# Patient Record
Sex: FEMALE | Race: WHITE | Employment: UNEMPLOYED | URBAN - METROPOLITAN AREA
[De-identification: names, ages, dates, MRNs, and addresses within clinical notes are randomized per-mention and may not be internally consistent; named-entity substitution may affect disease eponyms.]

---

## 2018-01-10 ENCOUNTER — OFFICE VISIT (OUTPATIENT)
Dept: OBGYN CLINIC | Age: 18
End: 2018-01-10
Payer: COMMERCIAL

## 2018-01-10 ENCOUNTER — HOSPITAL ENCOUNTER (OUTPATIENT)
Age: 18
Setting detail: SPECIMEN
Discharge: HOME | End: 2018-01-10
Payer: COMMERCIAL

## 2018-01-10 VITALS
SYSTOLIC BLOOD PRESSURE: 132 MMHG | HEART RATE: 84 BPM | WEIGHT: 217 LBS | HEIGHT: 61 IN | BODY MASS INDEX: 40.97 KG/M2 | DIASTOLIC BLOOD PRESSURE: 75 MMHG

## 2018-01-10 DIAGNOSIS — R10.84 GENERALIZED ABDOMINAL PAIN: ICD-10-CM

## 2018-01-10 DIAGNOSIS — G89.18 POSTOPERATIVE PAIN: ICD-10-CM

## 2018-01-10 DIAGNOSIS — R10.2 VAGINAL PAIN: ICD-10-CM

## 2018-01-10 DIAGNOSIS — R11.0 NAUSEA IN ADULT: ICD-10-CM

## 2018-01-10 DIAGNOSIS — R30.0 DYSURIA: ICD-10-CM

## 2018-01-10 DIAGNOSIS — R10.84 GENERALIZED ABDOMINAL PAIN: Primary | ICD-10-CM

## 2018-01-10 LAB
DIRECT EXAM: NORMAL
Lab: NORMAL
SPECIMEN DESCRIPTION: NORMAL
STATUS: NORMAL

## 2018-01-10 PROCEDURE — 99203 OFFICE O/P NEW LOW 30 MIN: CPT | Performed by: OBSTETRICS & GYNECOLOGY

## 2018-01-10 RX ORDER — LORAZEPAM 0.5 MG/1
0.5 TABLET ORAL DAILY
COMMUNITY
End: 2021-03-10 | Stop reason: ALTCHOICE

## 2018-01-10 RX ORDER — HYDROCODONE BITARTRATE AND ACETAMINOPHEN 5; 325 MG/1; MG/1
1 TABLET ORAL EVERY 6 HOURS PRN
Qty: 30 TABLET | Refills: 0 | Status: SHIPPED | OUTPATIENT
Start: 2018-01-10 | End: 2018-01-15

## 2018-01-10 RX ORDER — IBUPROFEN 600 MG/1
600 TABLET ORAL EVERY 6 HOURS PRN
COMMUNITY
End: 2022-09-28

## 2018-01-10 RX ORDER — ZALEPLON 10 MG/1
10 CAPSULE ORAL NIGHTLY
COMMUNITY
End: 2022-06-21

## 2018-01-10 RX ORDER — OXCARBAZEPINE 600 MG/1
600 TABLET, FILM COATED ORAL 2 TIMES DAILY
COMMUNITY

## 2018-01-10 RX ORDER — PROMETHAZINE HYDROCHLORIDE 12.5 MG/1
12.5 TABLET ORAL EVERY 6 HOURS PRN
Qty: 30 TABLET | Refills: 0 | Status: SHIPPED | OUTPATIENT
Start: 2018-01-10 | End: 2022-06-21

## 2018-01-10 RX ORDER — ONDANSETRON HYDROCHLORIDE 8 MG/1
8 TABLET, FILM COATED ORAL EVERY 8 HOURS PRN
Status: ON HOLD | COMMUNITY
End: 2019-01-15

## 2018-01-10 RX ORDER — HYDROMORPHONE HYDROCHLORIDE 2 MG/1
2 TABLET ORAL EVERY 4 HOURS PRN
Status: ON HOLD | COMMUNITY
End: 2019-01-15 | Stop reason: ALTCHOICE

## 2018-01-10 ASSESSMENT — ENCOUNTER SYMPTOMS
EYE DISCHARGE: 0
BACK PAIN: 0
EYE PAIN: 0
ABDOMINAL PAIN: 1
NAUSEA: 1
SHORTNESS OF BREATH: 0

## 2018-01-10 NOTE — PROGRESS NOTES
Normal range of motion. She exhibits no deformity. Neurological: She is alert and oriented to person, place, and time. Skin: Skin is warm and dry. Psychiatric: She has a normal mood and affect. Her behavior is normal. Thought content normal.       Assessment/Plan  1. Generalized abdominal pain  - Secondary to laparoscopic surgery. - US Pelvis Complete; Future  - US Non OB Transvaginal; Future  - Had MRSA infection in umbilical site - will repeat culture    2. Postoperative pain  - 1 month s/p laparoscopic detorsion of the right ovary  - US Pelvis Complete; Future  - US Non OB Transvaginal; Future    3. Vaginal pain  - Very tender with palpation. Per mom they put a stitch in the vagina because there was a tear during surgery. - Vaginal discharge with odor noted  - VAGINITIS DNA PROBE; Future    Will call mom with results of ultrasound    Patient was seen with total face to face time of 30 minutes. More than 50% of this visit was on counseling and education regarding her   1. Generalized abdominal pain  US Pelvis Complete    US Non OB Transvaginal   2. Postoperative pain  US Pelvis Complete    US Non OB Transvaginal   3. Vaginal pain  VAGINITIS DNA PROBE    and her options. She was also counseled on her preventative health maintenance recommendations and follow-up.     Tena Dietz MD  [address]

## 2018-01-11 ENCOUNTER — HOSPITAL ENCOUNTER (OUTPATIENT)
Dept: ULTRASOUND IMAGING | Age: 18
Discharge: HOME | End: 2018-01-11
Payer: COMMERCIAL

## 2018-01-11 DIAGNOSIS — G89.18 POSTOPERATIVE PAIN: ICD-10-CM

## 2018-01-11 DIAGNOSIS — R10.84 GENERALIZED ABDOMINAL PAIN: ICD-10-CM

## 2018-01-11 LAB
CULTURE: NORMAL
CULTURE: NORMAL
Lab: NORMAL
SPECIMEN DESCRIPTION: NORMAL
STATUS: NORMAL

## 2018-01-11 PROCEDURE — 76856 US EXAM PELVIC COMPLETE: CPT

## 2018-01-11 PROCEDURE — 76830 TRANSVAGINAL US NON-OB: CPT

## 2018-01-12 DIAGNOSIS — G89.18 POSTOPERATIVE ABDOMINAL PAIN: Primary | ICD-10-CM

## 2018-01-12 DIAGNOSIS — R10.9 POSTOPERATIVE ABDOMINAL PAIN: Primary | ICD-10-CM

## 2018-01-12 LAB
CULTURE: ABNORMAL
DIRECT EXAM: ABNORMAL
Lab: ABNORMAL
ORGANISM: ABNORMAL
SPECIMEN DESCRIPTION: ABNORMAL
STATUS: ABNORMAL

## 2018-01-22 ENCOUNTER — HOSPITAL ENCOUNTER (OUTPATIENT)
Dept: CT IMAGING | Age: 18
Discharge: HOME | End: 2018-01-22
Payer: COMMERCIAL

## 2018-01-22 DIAGNOSIS — G89.18 POSTOPERATIVE ABDOMINAL PAIN: ICD-10-CM

## 2018-01-22 DIAGNOSIS — R10.9 POSTOPERATIVE ABDOMINAL PAIN: ICD-10-CM

## 2018-01-22 PROCEDURE — 6360000004 HC RX CONTRAST MEDICATION: Performed by: OBSTETRICS & GYNECOLOGY

## 2018-01-22 PROCEDURE — 74177 CT ABD & PELVIS W/CONTRAST: CPT

## 2018-01-22 PROCEDURE — 2580000003 HC RX 258: Performed by: OBSTETRICS & GYNECOLOGY

## 2018-01-22 RX ORDER — 0.9 % SODIUM CHLORIDE 0.9 %
100 INTRAVENOUS SOLUTION INTRAVENOUS ONCE
Status: COMPLETED | OUTPATIENT
Start: 2018-01-22 | End: 2018-01-22

## 2018-01-22 RX ADMIN — IOPAMIDOL 100 ML: 755 INJECTION, SOLUTION INTRAVENOUS at 15:48

## 2018-01-22 RX ADMIN — SODIUM CHLORIDE 100 ML: 9 INJECTION, SOLUTION INTRAVENOUS at 15:48

## 2018-11-21 ENCOUNTER — OFFICE VISIT (OUTPATIENT)
Dept: INTERNAL MEDICINE CLINIC | Age: 18
End: 2018-11-21
Payer: COMMERCIAL

## 2018-11-21 VITALS
BODY MASS INDEX: 45.31 KG/M2 | SYSTOLIC BLOOD PRESSURE: 122 MMHG | WEIGHT: 240 LBS | DIASTOLIC BLOOD PRESSURE: 80 MMHG | HEIGHT: 61 IN

## 2018-11-21 DIAGNOSIS — Z87.898 HX OF SYNCOPE: ICD-10-CM

## 2018-11-21 DIAGNOSIS — L50.1 CHRONIC IDIOPATHIC URTICARIA: ICD-10-CM

## 2018-11-21 DIAGNOSIS — R16.0 LIVER MASS: ICD-10-CM

## 2018-11-21 DIAGNOSIS — E06.3 HASHIMOTO'S DISEASE: Primary | ICD-10-CM

## 2018-11-21 DIAGNOSIS — N83.519 OVARIAN TORSION: ICD-10-CM

## 2018-11-21 PROCEDURE — G8427 DOCREV CUR MEDS BY ELIG CLIN: HCPCS | Performed by: INTERNAL MEDICINE

## 2018-11-21 PROCEDURE — G8417 CALC BMI ABV UP PARAM F/U: HCPCS | Performed by: INTERNAL MEDICINE

## 2018-11-21 PROCEDURE — G8484 FLU IMMUNIZE NO ADMIN: HCPCS | Performed by: INTERNAL MEDICINE

## 2018-11-21 PROCEDURE — 1036F TOBACCO NON-USER: CPT | Performed by: INTERNAL MEDICINE

## 2018-11-21 PROCEDURE — 99204 OFFICE O/P NEW MOD 45 MIN: CPT | Performed by: INTERNAL MEDICINE

## 2018-11-21 RX ORDER — ZALEPLON 10 MG/1
1 CAPSULE ORAL NIGHTLY
Status: ON HOLD | COMMUNITY
Start: 2018-06-14 | End: 2019-01-15 | Stop reason: SDUPTHER

## 2018-11-21 RX ORDER — DOXEPIN HYDROCHLORIDE 10 MG/1
10 CAPSULE ORAL 2 TIMES DAILY
COMMUNITY
Start: 2018-07-03 | End: 2022-06-21

## 2018-11-21 RX ORDER — LORAZEPAM 1 MG/1
1 TABLET ORAL DAILY
COMMUNITY
Start: 2018-06-14 | End: 2021-03-10 | Stop reason: ALTCHOICE

## 2018-11-21 RX ORDER — OXCARBAZEPINE 600 MG/1
1 TABLET, FILM COATED ORAL 2 TIMES DAILY
Status: ON HOLD | COMMUNITY
Start: 2018-06-14 | End: 2019-01-15 | Stop reason: SDUPTHER

## 2018-11-21 RX ORDER — DESOGESTREL AND ETHINYL ESTRADIOL 0.15-0.03
1 KIT ORAL DAILY
COMMUNITY
Start: 2018-06-23 | End: 2021-03-10 | Stop reason: ALTCHOICE

## 2018-11-21 RX ORDER — MONTELUKAST SODIUM 10 MG/1
1 TABLET ORAL EVERY OTHER DAY
Status: ON HOLD | COMMUNITY
Start: 2018-06-26 | End: 2019-01-15

## 2018-11-21 RX ORDER — LEVOCETIRIZINE DIHYDROCHLORIDE 5 MG/1
10 TABLET, FILM COATED ORAL 2 TIMES DAILY
COMMUNITY
End: 2022-06-21

## 2018-11-21 RX ORDER — FAMOTIDINE 20 MG/1
1 TABLET, FILM COATED ORAL 2 TIMES DAILY
COMMUNITY
Start: 2018-06-26 | End: 2021-03-10 | Stop reason: ALTCHOICE

## 2018-11-21 RX ORDER — LEVOTHYROXINE SODIUM 0.12 MG/1
1 TABLET ORAL DAILY
COMMUNITY
Start: 2018-06-26 | End: 2022-06-21

## 2018-11-21 ASSESSMENT — PATIENT HEALTH QUESTIONNAIRE - PHQ9
SUM OF ALL RESPONSES TO PHQ QUESTIONS 1-9: 2
2. FEELING DOWN, DEPRESSED OR HOPELESS: 1
1. LITTLE INTEREST OR PLEASURE IN DOING THINGS: 1
SUM OF ALL RESPONSES TO PHQ9 QUESTIONS 1 & 2: 2
SUM OF ALL RESPONSES TO PHQ QUESTIONS 1-9: 2

## 2018-11-21 NOTE — PROGRESS NOTES
Visit Information    Have you changed or started any medications since your last visit including any over-the-counter medicines, vitamins, or herbal medicines? no   Are you having any side effects from any of your medications? -  no  Have you stopped taking any of your medications? Is so, why? -  no    Have you seen any other physician or provider since your last visit? Yes - Records Obtained  Have you had any other diagnostic tests since your last visit? Yes - Records Obtained  Have you been seen in the emergency room and/or had an admission to a hospital since we last saw you? Yes - Records Obtained  Have you had your routine dental cleaning in the past 6 months? no    Have you activated your [facility] account? If not, what are your barriers?  No:      Patient Care Team:  Lionel oMss MD as PCP - General (Internal Medicine)    Medical History Review  Past Medical, Family, and Social History reviewed and does contribute to the patient presenting condition  Chief Complaint   Patient presents with   Evern Dural Establish Care    Urticaria     She has hives with angioedema, acute anaphylaxis     Mass     liver mass found at the ED      Health Maintenance   Topic Date Due    HIV screen  12/14/2013    Meningococcal (MCV) Vaccine Age 0-22 Years (1 of 1 - 2-dose series) 12/14/2014    Chlamydia screen  12/14/2014    DTaP/Tdap/Td vaccine (1 - Tdap) 12/14/2017    Flu vaccine (1) 09/01/2018                                                                                                                                                                                                          OFFICE VISIT  PROGRESS NOTE         Date of patient's visit: 11/23/2018  Patient's Name:  Pilar Kan  YOB: 2000       Patient Care Team:  Lionel Moss MD as PCP - General (Internal Medicine)        SUBJECTIVE     HISTORY           History of present illness     Pertinent details  added to ,       Chief Complaint   Patient

## 2018-11-27 ENCOUNTER — HOSPITAL ENCOUNTER (OUTPATIENT)
Age: 18
Setting detail: SPECIMEN
Discharge: HOME | End: 2018-11-27
Payer: COMMERCIAL

## 2018-11-27 ENCOUNTER — HOSPITAL ENCOUNTER (OUTPATIENT)
Dept: ULTRASOUND IMAGING | Age: 18
Discharge: HOME | End: 2018-11-29
Payer: COMMERCIAL

## 2018-11-27 DIAGNOSIS — R16.0 LIVER MASS: ICD-10-CM

## 2018-11-27 DIAGNOSIS — L50.1 CHRONIC IDIOPATHIC URTICARIA: ICD-10-CM

## 2018-11-27 LAB
ALBUMIN SERPL-MCNC: 3.8 G/DL (ref 3.5–5.2)
ALBUMIN/GLOBULIN RATIO: 1.2 (ref 1–2.5)
ALP BLD-CCNC: 80 U/L (ref 35–104)
ALT SERPL-CCNC: 6 U/L (ref 5–33)
AST SERPL-CCNC: 8 U/L
BILIRUB SERPL-MCNC: 0.17 MG/DL (ref 0.3–1.2)
BILIRUBIN DIRECT: <0.08 MG/DL
BILIRUBIN, INDIRECT: ABNORMAL MG/DL (ref 0–1)
GLOBULIN: ABNORMAL G/DL (ref 1.5–3.8)
HEPATITIS C ANTIBODY: NONREACTIVE
SEDIMENTATION RATE, ERYTHROCYTE: 36 MM (ref 0–20)
TOTAL PROTEIN: 7 G/DL (ref 6.4–8.3)
VITAMIN D 25-HYDROXY: 41.2 NG/ML (ref 30–100)

## 2018-11-27 PROCEDURE — 76705 ECHO EXAM OF ABDOMEN: CPT

## 2018-11-28 LAB — ANTI-NUCLEAR ANTIBODY (ANA): NEGATIVE

## 2018-11-30 LAB — C1 ESTERASE INHIBITOR: 27 MG/DL (ref 21–39)

## 2018-12-03 ENCOUNTER — TELEPHONE (OUTPATIENT)
Dept: INTERNAL MEDICINE CLINIC | Age: 18
End: 2018-12-03

## 2018-12-04 ENCOUNTER — OFFICE VISIT (OUTPATIENT)
Dept: GASTROENTEROLOGY | Age: 18
End: 2018-12-04
Payer: COMMERCIAL

## 2018-12-04 VITALS
WEIGHT: 236.6 LBS | BODY MASS INDEX: 44.34 KG/M2 | SYSTOLIC BLOOD PRESSURE: 129 MMHG | HEART RATE: 91 BPM | DIASTOLIC BLOOD PRESSURE: 82 MMHG

## 2018-12-04 DIAGNOSIS — R16.0 LIVER MASS: Primary | ICD-10-CM

## 2018-12-04 PROBLEM — F41.9 ANXIETY: Status: ACTIVE | Noted: 2018-04-27

## 2018-12-04 PROBLEM — R93.89 ABNORMAL FINDING ON RADIOLOGY EXAM: Status: ACTIVE | Noted: 2017-08-01

## 2018-12-04 PROBLEM — R55 NEUROCARDIOGENIC SYNCOPE: Status: ACTIVE | Noted: 2018-04-27

## 2018-12-04 PROBLEM — E61.1 IRON DEFICIENCY: Status: ACTIVE | Noted: 2017-08-01

## 2018-12-04 PROBLEM — L50.8 CHRONIC URTICARIA: Status: ACTIVE | Noted: 2018-04-26

## 2018-12-04 PROBLEM — R19.7 BLOODY DIARRHEA: Status: ACTIVE | Noted: 2017-06-22

## 2018-12-04 PROCEDURE — G8484 FLU IMMUNIZE NO ADMIN: HCPCS | Performed by: INTERNAL MEDICINE

## 2018-12-04 PROCEDURE — G8417 CALC BMI ABV UP PARAM F/U: HCPCS | Performed by: INTERNAL MEDICINE

## 2018-12-04 PROCEDURE — G8427 DOCREV CUR MEDS BY ELIG CLIN: HCPCS | Performed by: INTERNAL MEDICINE

## 2018-12-04 PROCEDURE — 99244 OFF/OP CNSLTJ NEW/EST MOD 40: CPT | Performed by: INTERNAL MEDICINE

## 2018-12-04 ASSESSMENT — ENCOUNTER SYMPTOMS
DIARRHEA: 0
ABDOMINAL PAIN: 0
TROUBLE SWALLOWING: 0
RECTAL PAIN: 0
ABDOMINAL DISTENTION: 0
SINUS PRESSURE: 0
CONSTIPATION: 0
SINUS PAIN: 0
CHOKING: 0
EYES NEGATIVE: 1
NAUSEA: 0
BLOOD IN STOOL: 0
VOMITING: 0
ANAL BLEEDING: 0
COUGH: 0

## 2018-12-04 NOTE — PROGRESS NOTES
Subjective:      Patient ID: Erica Small is a 21 y.o. female. HPI . Dr. Pasha Bernal MD has requested that I see Erica Small for a consult for   1. Liver mass     . Rhina Rodriguez, 49-year-old the patient seen in the office along with her mother. Patient had generalized hives. To further evaluate the etiology for hives, patient had a CT scan done which revealed a lesion in the right lobe of the liver. Subsequently patient had ultrasound of the liver done which revealed possible hemangioma. This lesion is about 3 cm. On further discussion patient denies abdominal pain, bloating, nausea or vomiting. No fever or chills. Has a good appetite and there is no weight loss. Not known to have liver disease. She is not an alcoholic. No history of hepatitis, jaundice etc.  No family history of liver lesions. No family history of liver cancer. There is no family history of hepatitis. Patient known to have ovarian cysts for which she had surgery done in the past.  Patient has been on birth control pills regarding this. Patient has been on birth control pills in the last to 3 years. Patient has history of hives. Also states that she may have intermittent anaphylactic reactions. The full details regarding this is not clear. She is under the care of the allergist.  After discussion, mother indicates that this patient may have idiopathic urticaria. Past Medical, Family, and Social History reviewed and does contribute to the patient presenting condition. patient\"s PMH/PSH,SH,PSYCH hx, MEDs, ALLERGIES, and ROS was all reviewed and updated ion the appropriate sections    Review of Systems   Constitutional: Negative. HENT: Positive for nosebleeds. Negative for sinus pain, sinus pressure and trouble swallowing. Eyes: Negative. Respiratory: Negative for cough and choking. Cardiovascular: Negative.     Gastrointestinal: Negative for abdominal distention, abdominal pain, anal bleeding, blood in with an ultrasound of the liver in the next 6 months. Also we'll obtain viral hepatitis screen. This was recommended to patient and patient's mother, they understood and agreed for the management plan. Advised to see in the next 6 months.

## 2018-12-05 ENCOUNTER — TELEPHONE (OUTPATIENT)
Dept: GASTROENTEROLOGY | Age: 18
End: 2018-12-05

## 2018-12-05 NOTE — TELEPHONE ENCOUNTER
Writer spoke to patients mother regarding labs that [facility] would like the patient to get done, mother Micheal Flores understood and agreed and said that the patient would be in the office tomorrow to grab the lab slips.

## 2018-12-20 ENCOUNTER — HOSPITAL ENCOUNTER (OUTPATIENT)
Age: 18
Setting detail: SPECIMEN
Discharge: HOME | End: 2018-12-20
Payer: COMMERCIAL

## 2018-12-20 DIAGNOSIS — R16.0 LIVER MASS: ICD-10-CM

## 2018-12-21 LAB
AFP: 2.6 UG/L
HAV IGM SER IA-ACNC: NONREACTIVE
HEPATITIS B CORE IGM ANTIBODY: NONREACTIVE
HEPATITIS B SURFACE ANTIGEN: NONREACTIVE
HEPATITIS C ANTIBODY: NONREACTIVE

## 2018-12-27 LAB
EBV EARLY ANTIGEN IGG: 61 U/ML
EPSTEIN-BARR VCA IGG: 1089 U/ML
EPSTEIN-BARR VCA IGM: 95 U/ML

## 2019-01-09 ENCOUNTER — OFFICE VISIT (OUTPATIENT)
Dept: GASTROENTEROLOGY | Age: 19
End: 2019-01-09
Payer: COMMERCIAL

## 2019-01-09 VITALS
HEART RATE: 95 BPM | DIASTOLIC BLOOD PRESSURE: 80 MMHG | SYSTOLIC BLOOD PRESSURE: 128 MMHG | HEIGHT: 61 IN | WEIGHT: 235.7 LBS | BODY MASS INDEX: 44.5 KG/M2

## 2019-01-09 DIAGNOSIS — R93.89 ABNORMAL FINDING ON RADIOLOGY EXAM: ICD-10-CM

## 2019-01-09 DIAGNOSIS — Z87.898 HX OF SYNCOPE: Primary | ICD-10-CM

## 2019-01-09 DIAGNOSIS — R10.13 ABDOMINAL PAIN, EPIGASTRIC: ICD-10-CM

## 2019-01-09 DIAGNOSIS — K76.9 LIVER LESION: ICD-10-CM

## 2019-01-09 PROCEDURE — 1036F TOBACCO NON-USER: CPT | Performed by: INTERNAL MEDICINE

## 2019-01-09 PROCEDURE — G8417 CALC BMI ABV UP PARAM F/U: HCPCS | Performed by: INTERNAL MEDICINE

## 2019-01-09 PROCEDURE — G8484 FLU IMMUNIZE NO ADMIN: HCPCS | Performed by: INTERNAL MEDICINE

## 2019-01-09 PROCEDURE — G8427 DOCREV CUR MEDS BY ELIG CLIN: HCPCS | Performed by: INTERNAL MEDICINE

## 2019-01-09 PROCEDURE — 99214 OFFICE O/P EST MOD 30 MIN: CPT | Performed by: INTERNAL MEDICINE

## 2019-01-09 ASSESSMENT — ENCOUNTER SYMPTOMS
VOMITING: 1
DIARRHEA: 1
WHEEZING: 0
SHORTNESS OF BREATH: 1
EYE REDNESS: 0
CONSTIPATION: 0
RECTAL PAIN: 0
NAUSEA: 1
ABDOMINAL DISTENTION: 1
BLOOD IN STOOL: 0
ABDOMINAL PAIN: 1
SINUS PAIN: 0
SINUS PRESSURE: 0
ANAL BLEEDING: 0
CHEST TIGHTNESS: 0
EYE PAIN: 0
TROUBLE SWALLOWING: 0

## 2019-01-15 ENCOUNTER — HOSPITAL ENCOUNTER (OUTPATIENT)
Age: 19
Setting detail: OUTPATIENT SURGERY
Discharge: HOME | End: 2019-01-15
Attending: INTERNAL MEDICINE | Admitting: INTERNAL MEDICINE
Payer: COMMERCIAL

## 2019-01-15 ENCOUNTER — HOSPITAL ENCOUNTER (OUTPATIENT)
Dept: MRI IMAGING | Age: 19
Discharge: HOME | End: 2019-01-17
Payer: COMMERCIAL

## 2019-01-15 VITALS
SYSTOLIC BLOOD PRESSURE: 129 MMHG | TEMPERATURE: 97.3 F | OXYGEN SATURATION: 96 % | BODY MASS INDEX: 43.43 KG/M2 | WEIGHT: 230 LBS | DIASTOLIC BLOOD PRESSURE: 83 MMHG | RESPIRATION RATE: 20 BRPM | HEIGHT: 61 IN | HEART RATE: 100 BPM

## 2019-01-15 VITALS — HEIGHT: 61 IN | WEIGHT: 230 LBS | BODY MASS INDEX: 43.43 KG/M2

## 2019-01-15 DIAGNOSIS — K76.9 LIVER LESION: ICD-10-CM

## 2019-01-15 PROCEDURE — 88305 TISSUE EXAM BY PATHOLOGIST: CPT

## 2019-01-15 PROCEDURE — 99152 MOD SED SAME PHYS/QHP 5/>YRS: CPT | Performed by: INTERNAL MEDICINE

## 2019-01-15 PROCEDURE — A9581 GADOXETATE DISODIUM INJ: HCPCS | Performed by: INTERNAL MEDICINE

## 2019-01-15 PROCEDURE — 6360000004 HC RX CONTRAST MEDICATION: Performed by: INTERNAL MEDICINE

## 2019-01-15 PROCEDURE — 74183 MRI ABD W/O CNTR FLWD CNTR: CPT

## 2019-01-15 PROCEDURE — 6360000002 HC RX W HCPCS: Performed by: INTERNAL MEDICINE

## 2019-01-15 PROCEDURE — 3609012400 HC EGD TRANSORAL BIOPSY SINGLE/MULTIPLE: Performed by: INTERNAL MEDICINE

## 2019-01-15 PROCEDURE — 88342 IMHCHEM/IMCYTCHM 1ST ANTB: CPT

## 2019-01-15 PROCEDURE — 2580000003 HC RX 258: Performed by: INTERNAL MEDICINE

## 2019-01-15 PROCEDURE — 2709999900 HC NON-CHARGEABLE SUPPLY: Performed by: INTERNAL MEDICINE

## 2019-01-15 PROCEDURE — 6370000000 HC RX 637 (ALT 250 FOR IP): Performed by: INTERNAL MEDICINE

## 2019-01-15 PROCEDURE — 7100000011 HC PHASE II RECOVERY - ADDTL 15 MIN: Performed by: INTERNAL MEDICINE

## 2019-01-15 PROCEDURE — 7100000010 HC PHASE II RECOVERY - FIRST 15 MIN: Performed by: INTERNAL MEDICINE

## 2019-01-15 RX ORDER — MIDAZOLAM HYDROCHLORIDE 1 MG/ML
INJECTION INTRAMUSCULAR; INTRAVENOUS PRN
Status: DISCONTINUED | OUTPATIENT
Start: 2019-01-15 | End: 2019-01-15 | Stop reason: HOSPADM

## 2019-01-15 RX ORDER — FENTANYL CITRATE 50 UG/ML
INJECTION, SOLUTION INTRAMUSCULAR; INTRAVENOUS PRN
Status: DISCONTINUED | OUTPATIENT
Start: 2019-01-15 | End: 2019-01-15 | Stop reason: HOSPADM

## 2019-01-15 RX ORDER — 0.9 % SODIUM CHLORIDE 0.9 %
50 INTRAVENOUS SOLUTION INTRAVENOUS ONCE
Status: COMPLETED | OUTPATIENT
Start: 2019-01-15 | End: 2019-01-15

## 2019-01-15 RX ORDER — SODIUM CHLORIDE 0.9 % (FLUSH) 0.9 %
10 SYRINGE (ML) INJECTION PRN
Status: DISCONTINUED | OUTPATIENT
Start: 2019-01-15 | End: 2019-01-18 | Stop reason: HOSPADM

## 2019-01-15 RX ORDER — SODIUM CHLORIDE 9 MG/ML
INJECTION, SOLUTION INTRAVENOUS CONTINUOUS
Status: DISCONTINUED | OUTPATIENT
Start: 2019-01-15 | End: 2019-01-15 | Stop reason: HOSPADM

## 2019-01-15 RX ADMIN — SODIUM CHLORIDE: 9 INJECTION, SOLUTION INTRAVENOUS at 08:50

## 2019-01-15 RX ADMIN — GADOXETATE DISODIUM 10 ML: 181.43 INJECTION, SOLUTION INTRAVENOUS at 08:15

## 2019-01-15 RX ADMIN — SODIUM CHLORIDE 50 ML: 9 INJECTION, SOLUTION INTRAVENOUS at 08:14

## 2019-01-15 RX ADMIN — Medication 10 ML: at 08:15

## 2019-01-15 ASSESSMENT — PAIN - FUNCTIONAL ASSESSMENT: PAIN_FUNCTIONAL_ASSESSMENT: 0-10

## 2019-01-15 ASSESSMENT — PAIN SCALES - GENERAL: PAINLEVEL_OUTOF10: 0

## 2019-01-15 ASSESSMENT — PAIN DESCRIPTION - DESCRIPTORS: DESCRIPTORS: CRAMPING

## 2019-01-17 ENCOUNTER — TELEPHONE (OUTPATIENT)
Dept: FAMILY MEDICINE CLINIC | Age: 19
End: 2019-01-17

## 2019-01-17 LAB — SURGICAL PATHOLOGY REPORT: NORMAL

## 2019-03-26 ENCOUNTER — OFFICE VISIT (OUTPATIENT)
Dept: OBGYN CLINIC | Age: 19
End: 2019-03-26
Payer: COMMERCIAL

## 2019-03-26 VITALS
SYSTOLIC BLOOD PRESSURE: 136 MMHG | HEART RATE: 123 BPM | DIASTOLIC BLOOD PRESSURE: 84 MMHG | HEIGHT: 61 IN | WEIGHT: 232 LBS | BODY MASS INDEX: 43.8 KG/M2

## 2019-03-26 DIAGNOSIS — E66.01 CLASS 3 SEVERE OBESITY WITHOUT SERIOUS COMORBIDITY WITH BODY MASS INDEX (BMI) OF 40.0 TO 44.9 IN ADULT, UNSPECIFIED OBESITY TYPE (HCC): ICD-10-CM

## 2019-03-26 DIAGNOSIS — D13.4 HEPATIC ADENOMA: Primary | ICD-10-CM

## 2019-03-26 DIAGNOSIS — Z87.42 HISTORY OF OVARIAN CYST: ICD-10-CM

## 2019-03-26 PROCEDURE — 1036F TOBACCO NON-USER: CPT | Performed by: OBSTETRICS & GYNECOLOGY

## 2019-03-26 PROCEDURE — G8427 DOCREV CUR MEDS BY ELIG CLIN: HCPCS | Performed by: OBSTETRICS & GYNECOLOGY

## 2019-03-26 PROCEDURE — 99213 OFFICE O/P EST LOW 20 MIN: CPT | Performed by: OBSTETRICS & GYNECOLOGY

## 2019-03-26 PROCEDURE — G8417 CALC BMI ABV UP PARAM F/U: HCPCS | Performed by: OBSTETRICS & GYNECOLOGY

## 2019-03-26 PROCEDURE — G8484 FLU IMMUNIZE NO ADMIN: HCPCS | Performed by: OBSTETRICS & GYNECOLOGY

## 2019-03-26 ASSESSMENT — ENCOUNTER SYMPTOMS
SHORTNESS OF BREATH: 0
COUGH: 0
BACK PAIN: 0

## 2021-03-10 ENCOUNTER — OFFICE VISIT (OUTPATIENT)
Dept: OBGYN CLINIC | Age: 21
End: 2021-03-10
Payer: MEDICAID

## 2021-03-10 VITALS
DIASTOLIC BLOOD PRESSURE: 85 MMHG | SYSTOLIC BLOOD PRESSURE: 125 MMHG | HEART RATE: 95 BPM | BODY MASS INDEX: 44.75 KG/M2 | WEIGHT: 237 LBS | HEIGHT: 61 IN

## 2021-03-10 DIAGNOSIS — N63.31 LUMP OF AXILLARY TAIL OF RIGHT BREAST: Primary | ICD-10-CM

## 2021-03-10 PROCEDURE — 1036F TOBACCO NON-USER: CPT | Performed by: OBSTETRICS & GYNECOLOGY

## 2021-03-10 PROCEDURE — G8417 CALC BMI ABV UP PARAM F/U: HCPCS | Performed by: OBSTETRICS & GYNECOLOGY

## 2021-03-10 PROCEDURE — G8428 CUR MEDS NOT DOCUMENT: HCPCS | Performed by: OBSTETRICS & GYNECOLOGY

## 2021-03-10 PROCEDURE — 99212 OFFICE O/P EST SF 10 MIN: CPT | Performed by: OBSTETRICS & GYNECOLOGY

## 2021-03-10 PROCEDURE — G8484 FLU IMMUNIZE NO ADMIN: HCPCS | Performed by: OBSTETRICS & GYNECOLOGY

## 2021-03-10 RX ORDER — PHENOL 1.4 %
10 AEROSOL, SPRAY (ML) MUCOUS MEMBRANE NIGHTLY PRN
COMMUNITY
Start: 2021-02-26 | End: 2021-03-28

## 2021-03-10 RX ORDER — HYDROXYZINE HYDROCHLORIDE 25 MG/1
25 TABLET, FILM COATED ORAL 2 TIMES DAILY PRN
COMMUNITY
Start: 2021-02-26 | End: 2021-03-28

## 2021-03-10 RX ORDER — MIRTAZAPINE 30 MG/1
30 TABLET, FILM COATED ORAL NIGHTLY
COMMUNITY
Start: 2021-02-26 | End: 2022-06-21

## 2021-03-10 RX ORDER — LEVOTHYROXINE SODIUM 0.1 MG/1
150 TABLET ORAL DAILY
COMMUNITY
End: 2021-03-10 | Stop reason: SDUPTHER

## 2021-03-10 ASSESSMENT — ENCOUNTER SYMPTOMS
SHORTNESS OF BREATH: 0
ABDOMINAL PAIN: 0
BACK PAIN: 0
COUGH: 0

## 2021-03-10 NOTE — PROGRESS NOTES
[facility]  [facility] - [address] [facility] [address] [facility]  Dept: [phone]  Dept Fax: [phone]    03/10/21    Chief Complaint   Patient presents with    Breast Mass     Pt states she has a breast lump that comes and goes with ehr period that she would like to discuss        Anai Sutherland 23 y.o. has a complaint of a breast lump. She has a lump in her right armpit. She says that she get them with her periods. She was told previously that they were lymph nodes that swell and then go back to normal.  She was at the hospital last month for a suicide attempt. She says that she was at [facility] for a bit and they changed her medications and that has helped. She is on her period today and can't do her Pap smear. She has never been sexually active. She says her periods have been irregular every 3-5 weeks, but she cannot be on combination birth control due to her hepatic adenoma. She says they last for over a week. Review of Systems   Constitutional: Negative for chills and fever. HENT: Negative for congestion. Respiratory: Negative for cough and shortness of breath. Cardiovascular: Negative for chest pain and palpitations. Gastrointestinal: Negative for abdominal pain. Genitourinary: Negative for dyspareunia and vaginal discharge. Musculoskeletal: Negative for back pain. Neurological: Negative for dizziness and light-headedness. Psychiatric/Behavioral: The patient is not nervous/anxious. Gynecologic History  Patient's last menstrual period was 2021.   Contraception: abstinence  Last Pap: n/a    Obstetric History  : 0  Para: 0  AB: 0    Past Medical History:   Diagnosis Date    Acid reflux     Allergy, urticaria     Anxiety     Depression     Hashimoto's thyroiditis     History of seizures     last 2019    Ischemic colon ([address]     MRSA infection     Obesity     Syncope     cardioneurogenic     Past Surgical History: Procedure Laterality Date    COLONOSCOPY  2017    OVARY SURGERY Right     for ovarian torsion    TYMPANOSTOMY TUBE PLACEMENT Bilateral     at age 1   Polly Cushing UPPER GASTROINTESTINAL ENDOSCOPY  2017    UPPER GASTROINTESTINAL ENDOSCOPY N/A 1/15/2019    EGD BIOPSY performed by Mike Javed MD at [address]   Allergen Reactions    Prunus Persica Nausea And Vomiting    Soap Rash     Tide Detergent    Zofran [Ondansetron Hcl] Hives     Current Outpatient Medications   Medication Sig Dispense Refill    mirtazapine (REMERON) 30 MG tablet Take 30 mg by mouth nightly      Melatonin 10 MG TABS Take 10 mg by mouth nightly as needed      hydrOXYzine (ATARAX) 25 MG tablet Take 25 mg by mouth 2 times daily as needed      levothyroxine (SYNTHROID) 125 MCG tablet Take 1 tablet by mouth daily Indications: pt taking 150mcg       doxepin (SINEQUAN) 10 MG capsule Take 10 mg by mouth 2 times daily      levocetirizine (XYZAL) 5 MG tablet Take 10 mg by mouth 2 times daily      diphenhydrAMINE (BENADRYL) 50 MG tablet Take 75 mg by mouth      OXcarbazepine (TRILEPTAL) 600 MG tablet Take 600 mg by mouth 2 times daily      ibuprofen (ADVIL;MOTRIN) 600 MG tablet Take 600 mg by mouth every 6 hours as needed for Pain      promethazine (PHENERGAN) 12.5 MG tablet Take 1 tablet by mouth every 6 hours as needed for Nausea 30 tablet 0    Omalizumab (XOLAIR SC) Inject into the skin every 30 days      zaleplon (SONATA) 10 MG capsule Take 10 mg by mouth nightly. No current facility-administered medications for this visit.       Social History     Socioeconomic History    Marital status: Single     Spouse name: Not on file    Number of children: Not on file    Years of education: Not on file    Highest education level: Not on file   Occupational History    Not on file   Social Needs    Financial resource strain: Not on file    Food insecurity     Worry: Not on file     Inability: Not on file   Prolebrity needs     Medical: Not on file     Non-medical: Not on file   Tobacco Use    Smoking status: Never Smoker    Smokeless tobacco: Never Used   Substance and Sexual Activity    Alcohol use: No    Drug use: No    Sexual activity: Never   Lifestyle    Physical activity     Days per week: Not on file     Minutes per session: Not on file    Stress: Not on file   Relationships    Social connections     Talks on phone: Not on file     Gets together: Not on file     Attends Buddhist service: Not on file     Active member of club or organization: Not on file     Attends meetings of clubs or organizations: Not on file     Relationship status: Not on file    Intimate partner violence     Fear of current or ex partner: Not on file     Emotionally abused: Not on file     Physically abused: Not on file     Forced sexual activity: Not on file   Other Topics Concern    Not on file   Social History Narrative    Not on file     No family history on file. Physical exam Physical Exam  Constitutional:       Appearance: She is normal weight. HENT:      Head: Normocephalic. Eyes:      Extraocular Movements: Extraocular movements intact. Pulmonary:      Effort: Pulmonary effort is normal.   Chest:       Neurological:      Mental Status: She is alert. Assessment/Plan  1. Lump of axillary tail of right breast  - Likely lymph nodes in the axilla that are related to hormonal changes.       Pt to follow up for annual exam  Arvind Beyer MD  [address]

## 2021-04-14 ENCOUNTER — HOSPITAL ENCOUNTER (OUTPATIENT)
Age: 21
Setting detail: SPECIMEN
Discharge: HOME | End: 2021-04-14
Payer: MEDICAID

## 2021-04-14 ENCOUNTER — OFFICE VISIT (OUTPATIENT)
Dept: OBGYN CLINIC | Age: 21
End: 2021-04-14
Payer: MEDICAID

## 2021-04-14 VITALS
WEIGHT: 239 LBS | BODY MASS INDEX: 45.12 KG/M2 | DIASTOLIC BLOOD PRESSURE: 82 MMHG | HEIGHT: 61 IN | SYSTOLIC BLOOD PRESSURE: 131 MMHG | HEART RATE: 90 BPM

## 2021-04-14 DIAGNOSIS — N63.31 LUMP OF AXILLARY TAIL OF RIGHT BREAST: ICD-10-CM

## 2021-04-14 DIAGNOSIS — Z01.419 WELL WOMAN EXAM WITH ROUTINE GYNECOLOGICAL EXAM: Primary | ICD-10-CM

## 2021-04-14 PROCEDURE — 99395 PREV VISIT EST AGE 18-39: CPT | Performed by: OBSTETRICS & GYNECOLOGY

## 2021-04-14 ASSESSMENT — ENCOUNTER SYMPTOMS
ABDOMINAL PAIN: 0
BACK PAIN: 0
SHORTNESS OF BREATH: 0
COUGH: 0

## 2021-04-14 NOTE — PROGRESS NOTES
[facility]  [facility] - [address]  Dept: [phone]    Chief complaint:   Chief Complaint   Patient presents with    Gynecologic Exam     First pap       History Present Illness: David Hernandez is a 22 yo female who presents for her annual exam.  She is very anxious for her first Pap smear. She denies any complaints. She is not sexually active and has never been. She says that the lump in her right axilla went awayt, but is back again with her period. This has been going on for many years. She denies any change in her normal discharge. She denies any irritation or pelvic pain. She denies any bowel or bladder issues. Current Medications (OTC/Herbal):   Current Outpatient Medications   Medication Sig Dispense Refill    levothyroxine (SYNTHROID) 125 MCG tablet Take 1 tablet by mouth daily Indications: pt taking 150mcg       doxepin (SINEQUAN) 10 MG capsule Take 10 mg by mouth 2 times daily      levocetirizine (XYZAL) 5 MG tablet Take 10 mg by mouth 2 times daily      diphenhydrAMINE (BENADRYL) 50 MG tablet Take 75 mg by mouth      Omalizumab (XOLAIR SC) Inject into the skin every 30 days      OXcarbazepine (TRILEPTAL) 600 MG tablet Take 600 mg by mouth 2 times daily      ibuprofen (ADVIL;MOTRIN) 600 MG tablet Take 600 mg by mouth every 6 hours as needed for Pain      zaleplon (SONATA) 10 MG capsule Take 10 mg by mouth nightly.  promethazine (PHENERGAN) 12.5 MG tablet Take 1 tablet by mouth every 6 hours as needed for Nausea 30 tablet 0    mirtazapine (REMERON) 30 MG tablet Take 30 mg by mouth nightly      Melatonin 10 MG TABS Take 10 mg by mouth nightly as needed       No current facility-administered medications for this visit. Allergies:    Allergies   Allergen Reactions    Prunus Persica Nausea And Vomiting    Soap Rash     Tide Detergent    Zofran [Ondansetron Hcl] Hives     Past Medical History:   Past Medical History: Diagnosis Date    Acid reflux     Allergy, urticaria     Anxiety     Depression     Hashimoto's thyroiditis     History of seizures     last 2019    Ischemic colon ([facility] [address]     MRSA infection     Obesity     Syncope     cardioneurogenic     Past Surgical History:   Past Surgical History:   Procedure Laterality Date    COLONOSCOPY  2017    OVARY SURGERY Right     for ovarian torsion    TYMPANOSTOMY TUBE PLACEMENT Bilateral     at age 1   Roxanne UPPER GASTROINTESTINAL ENDOSCOPY  2017    UPPER GASTROINTESTINAL ENDOSCOPY N/A 1/15/2019    EGD BIOPSY performed by Anselmo Leonard MD at [place] [address] History:   0  Para 0  Gynecologic History: LMP 19   Menarche 9  Duration 7-9 d    Interval q  25-35 d  Tampons/Pads in a day: 3-5 pads  Last Pap: hasn't had a pap previously       Any history of abnormal paps no    PriorColpo/Biopsy n/a     Last Mammogram n/a  Contraception: none  Complications: none  STDs: none  Psychosocial History: 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   Caffeine Yes    At risk for depression Yes    Abuse:   No  Seatbelt:   Yes  Exercise:  No    Social History     Socioeconomic History    Marital status: Single     Spouse name: Not on file    Number of children: Not on file    Years of education: Not on file    Highest education level: Not on file   Occupational History    Not on file   Social Needs    Financial resource strain: Not on file    Food insecurity     Worry: Not on file     Inability: Not on file    Transportation needs     Medical: Not on file     Non-medical: Not on file   Tobacco Use    Smoking status: Never Smoker    Smokeless tobacco: Never Used   Substance and Sexual Activity    Alcohol use: No    Drug use: No    Sexual activity: Never   Lifestyle    Physical activity     Days per week: Not on file     Minutes per session: Not on file    Stress: Not on file   Relationships    Social connections     Talks on phone: Not on file     Gets

## 2021-04-19 LAB — CYTOLOGY REPORT: NORMAL

## 2022-06-20 RX ORDER — ESCITALOPRAM OXALATE 10 MG/1
10 TABLET ORAL
COMMUNITY

## 2022-06-21 ENCOUNTER — OFFICE VISIT (OUTPATIENT)
Dept: INTERNAL MEDICINE CLINIC | Age: 22
End: 2022-06-21
Payer: MEDICAID

## 2022-06-21 VITALS
BODY MASS INDEX: 47.2 KG/M2 | WEIGHT: 250 LBS | DIASTOLIC BLOOD PRESSURE: 66 MMHG | HEIGHT: 61 IN | SYSTOLIC BLOOD PRESSURE: 124 MMHG

## 2022-06-21 DIAGNOSIS — E03.8 HYPOTHYROIDISM DUE TO HASHIMOTO'S THYROIDITIS: ICD-10-CM

## 2022-06-21 DIAGNOSIS — E06.3 HASHIMOTO'S DISEASE: ICD-10-CM

## 2022-06-21 DIAGNOSIS — Z87.898 HX OF SYNCOPE: ICD-10-CM

## 2022-06-21 DIAGNOSIS — L50.8 CHRONIC URTICARIA: Primary | ICD-10-CM

## 2022-06-21 DIAGNOSIS — F41.9 ANXIETY: ICD-10-CM

## 2022-06-21 DIAGNOSIS — E06.3 HYPOTHYROIDISM DUE TO HASHIMOTO'S THYROIDITIS: ICD-10-CM

## 2022-06-21 DIAGNOSIS — F31.81 BIPOLAR 2 DISORDER (HCC): ICD-10-CM

## 2022-06-21 PROBLEM — L50.1 CHRONIC IDIOPATHIC URTICARIA: Status: RESOLVED | Noted: 2018-11-21 | Resolved: 2022-06-21

## 2022-06-21 PROBLEM — R93.89 ABNORMAL FINDING ON RADIOLOGY EXAM: Status: RESOLVED | Noted: 2017-08-01 | Resolved: 2022-06-21

## 2022-06-21 PROBLEM — E61.1 IRON DEFICIENCY: Status: RESOLVED | Noted: 2017-08-01 | Resolved: 2022-06-21

## 2022-06-21 PROBLEM — D18.03 HEMANGIOMA OF LIVER: Status: ACTIVE | Noted: 2018-11-21

## 2022-06-21 PROBLEM — R19.7 BLOODY DIARRHEA: Status: RESOLVED | Noted: 2017-06-22 | Resolved: 2022-06-21

## 2022-06-21 PROBLEM — R55 NEUROCARDIOGENIC SYNCOPE: Status: RESOLVED | Noted: 2018-04-27 | Resolved: 2022-06-21

## 2022-06-21 PROCEDURE — G8417 CALC BMI ABV UP PARAM F/U: HCPCS | Performed by: INTERNAL MEDICINE

## 2022-06-21 PROCEDURE — 99213 OFFICE O/P EST LOW 20 MIN: CPT | Performed by: INTERNAL MEDICINE

## 2022-06-21 PROCEDURE — G8427 DOCREV CUR MEDS BY ELIG CLIN: HCPCS | Performed by: INTERNAL MEDICINE

## 2022-06-21 PROCEDURE — 1036F TOBACCO NON-USER: CPT | Performed by: INTERNAL MEDICINE

## 2022-06-21 RX ORDER — LORAZEPAM 1 MG/1
1 TABLET ORAL EVERY 6 HOURS PRN
COMMUNITY

## 2022-06-21 RX ORDER — LEVOTHYROXINE SODIUM 0.15 MG/1
TABLET ORAL
COMMUNITY
Start: 2022-06-20 | End: 2022-06-21 | Stop reason: SDUPTHER

## 2022-06-21 RX ORDER — LEVOTHYROXINE SODIUM 0.15 MG/1
150 TABLET ORAL DAILY
Qty: 90 TABLET | Refills: 1 | Status: SHIPPED | OUTPATIENT
Start: 2022-06-21 | End: 2022-09-28 | Stop reason: SDUPTHER

## 2022-06-21 SDOH — ECONOMIC STABILITY: TRANSPORTATION INSECURITY
IN THE PAST 12 MONTHS, HAS THE LACK OF TRANSPORTATION KEPT YOU FROM MEDICAL APPOINTMENTS OR FROM GETTING MEDICATIONS?: NO

## 2022-06-21 SDOH — ECONOMIC STABILITY: TRANSPORTATION INSECURITY
IN THE PAST 12 MONTHS, HAS LACK OF TRANSPORTATION KEPT YOU FROM MEETINGS, WORK, OR FROM GETTING THINGS NEEDED FOR DAILY LIVING?: NO

## 2022-06-21 SDOH — ECONOMIC STABILITY: FOOD INSECURITY: WITHIN THE PAST 12 MONTHS, THE FOOD YOU BOUGHT JUST DIDN'T LAST AND YOU DIDN'T HAVE MONEY TO GET MORE.: NEVER TRUE

## 2022-06-21 SDOH — ECONOMIC STABILITY: FOOD INSECURITY: WITHIN THE PAST 12 MONTHS, YOU WORRIED THAT YOUR FOOD WOULD RUN OUT BEFORE YOU GOT MONEY TO BUY MORE.: NEVER TRUE

## 2022-06-21 ASSESSMENT — PATIENT HEALTH QUESTIONNAIRE - PHQ9
1. LITTLE INTEREST OR PLEASURE IN DOING THINGS: 0
SUM OF ALL RESPONSES TO PHQ9 QUESTIONS 1 & 2: 1
SUM OF ALL RESPONSES TO PHQ QUESTIONS 1-9: 1
2. FEELING DOWN, DEPRESSED OR HOPELESS: 1

## 2022-06-21 ASSESSMENT — SOCIAL DETERMINANTS OF HEALTH (SDOH): HOW HARD IS IT FOR YOU TO PAY FOR THE VERY BASICS LIKE FOOD, HOUSING, MEDICAL CARE, AND HEATING?: NOT VERY HARD

## 2022-06-21 NOTE — PROGRESS NOTES
Visit Information    Have you changed or started any medications since your last visit including any over-the-counter medicines, vitamins, or herbal medicines? no   Are you having any side effects from any of your medications? -  no  Have you stopped taking any of your medications? Is so, why? -  no    Have you seen any other physician or provider since your last visit? Yes - Records Obtained  Have you had any other diagnostic tests since your last visit? No  Have you been seen in the emergency room and/or had an admission to a hospital since we last saw you? No  Have you had your routine dental cleaning in the past 6 months? no    Have you activated your [employer] account? If not, what are your barriers?  Yes     Patient Care Team:  Carine Mendez MD as PCP - General (Internal Medicine)  Carine Mendez MD as PCP - [facility] Provider  Selvin Morocho MD as Consulting Physician (Gastroenterology)    Medical History Review  Past Medical, Family, and Social History reviewed and does not contribute to the patient presenting condition    Health Maintenance   Topic Date Due    COVID-19 Vaccine (1) Never done    HPV vaccine (1 - 2-dose series) Never done    Depression Screen  Never done    HIV screen  Never done    Chlamydia screen  Never done    DTaP/Tdap/Td vaccine (7 - Td or Tdap) 08/16/2021    Flu vaccine (Season Ended) 09/01/2022    Pap smear  04/14/2024    Hepatitis B vaccine  Completed    Hib vaccine  Completed    Varicella vaccine  Completed    Hepatitis C screen  Completed    Hepatitis A vaccine  Aged Out    Meningococcal (ACWY) vaccine  Aged Out    Pneumococcal 0-64 years Vaccine  Aged Out                                                                                                                                                                                                       OFFICE VISIT  PROGRESS NOTE         Date of patient's visit: 6/23/22  Patient's Name:  Chastity Womack of Birth: 2000       Patient Care Team:  Aly Murphy MD as PCP - General (Internal Medicine)  Aly Murphy MD as PCP - [facility] Empaneled Provider  Oswaldo Burrell MD as Consulting Physician (Gastroenterology)        SUBJECTIVE     HISTORY           History of present illness     Pertinent details  added to ,       Chief Complaint   Patient presents with    Hashimoto's Thyroiditis          Encounter Diagnoses   Name Primary?  Chronic urticaria Yes    Hx of syncope     Hashimoto's disease     Hypothyroidism due to Hashimoto's thyroiditis     Anxiety     Bipolar 2 disorder (HCC)         Symptom / problem          --history obtained from patient. -   The patient is on THYROID REPLACEMENT  Medications . No    complaints of   fatogue , weight gain , cold intolerance . No complaints of sweating , weight loss , tremors , diarrhea . Takes meds regularly . Has dawood   On xanax           MEDICATIONS:      Current Outpatient Medications   Medication Sig Dispense Refill    LORazepam (ATIVAN) 1 MG tablet Take 1 mg by mouth every 6 hours as needed for Anxiety.  levothyroxine (SYNTHROID) 150 MCG tablet Take 1 tablet by mouth Daily 90 tablet 1    escitalopram (LEXAPRO) 10 MG tablet Take 10 mg by mouth      mirtazapine (REMERON) 30 MG tablet Take 30 mg by mouth nightly      diphenhydrAMINE (BENADRYL) 50 MG tablet Take 75 mg by mouth      OXcarbazepine (TRILEPTAL) 600 MG tablet Take 600 mg by mouth 2 times daily      ibuprofen (ADVIL;MOTRIN) 600 MG tablet Take 600 mg by mouth every 6 hours as needed for Pain      Melatonin 10 MG TABS Take 10 mg by mouth nightly as needed       No current facility-administered medications for this visit. ALLERGIES:      Allergies   Allergen Reactions    Prunus Persica Nausea And Vomiting    Soap Rash     Tide Detergent    Zofran [Ondansetron Hcl] Hives       SOCIAL HISTORY   Reviewed and no change from previous record.      Edith Huang  reports that she has never smoked. She has never used smokeless tobacco.    FAMILY HISTORY:    Reviewed and No change from previous visit    REVIEW OF SYSTEMS:    Review of Systems        OBJECTIVE      Physical exam           Vitals:    06/21/22 1053   BP: 124/66   Site: Left Upper Arm   Weight: 250 lb (113.4 kg)   Height: 5' 1\" (1.549 m)         Physical Exam   Vitals:  /66 (Site: Left Upper Arm)   Ht 5' 1\" (1.549 m)   Wt 250 lb (113.4 kg)   BMI 47.24 kg/m²                 Body mass index is 47.24 kg/m².      Vitals:    06/21/22 1053   BP: 124/66   Site: Left Upper Arm   Weight: 250 lb (113.4 kg)   Height: 5' 1\" (1.549 m)       General -alert, well appearing, and in no distress  Skin - normal coloration and turgor, no rashes,no suspicious skin lesions noted  Eyes - pupils equal and reactive, extraocular eye movementsintact  Ears - bilateral TM's and external ear canals normal  Nose - normal and patent, no erythema, discharge or polyps  Mouth - mucous membranes are moist, pharynx normal without lesions  Neck - supple, no significant adenopathy  Lymphatics - no palpable lymphadenopathy, no hepatosplenomegaly    Chest - clear to auscultation, no wheezes, rales or rhonchi, symmetric air entry    Heart - normal rate, regular rhythm, no murmurs, rubs, clicks or gallops    Extremities - peripheral pulses normal, no pedal edema       Abdomen - soft, nontender, nondistended, no masses or organomegaly    Back - full range of motion, notenderness, palpable spasm or pain on motion    Neurological - alert, oriented, normal speech, no focal sensory or motor deficit  Musculoskeletal - no joint tenderness, deformity or swelling                DIAGNOSTICS / INSERTS / IMAGES    [x] Reviewed       Labs      URINE ANALYSIS: No results found for: Michelle Brar     CBC:No results found for: WBC, HGB, PLT     BMP:  No results found for: NA, K, CL, CO2, BUN, CREATININE, GLUCOSE     No results found for: LDLC  No results found for: LABA1C  No results found for: POCGLU   .     LIVER PROFILE:  Lab Results   Component Value Date    ALT 6 11/27/2018    AST 8 11/27/2018    PROT 7.0 11/27/2018    BILITOT 0.17 11/27/2018    BILIDIR <0.08 11/27/2018    LABALBU 3.8 11/27/2018          Results for orders placed or performed during the hospital encounter of 04/14/21   GYN Cytology   Result Value Ref Range    Cytology Report       INTERPRETATION    Cervical material, (ThinPrep vial, Imaging-assisted review):  Specimen Adequacy:       Satisfactory for evaluation.       - Endocervical/transformation zone component present. Descriptive Diagnosis:       Negative for intraepithelial lesion or malignancy. Cytotechnologist:   Janeth UPTON(ASCP)  **Electronically Signed Out**  ey/4/19/2021            Source:  1: Cervical material, (ThinPrep vial, Imaging-assisted review)    Clinical History  Z01.419 Routine gyn exam without abnormal findings  High Risk HPV DNA testing is requested if the diagnosis is ASC-US    GYNECOLOGIC CYTOLOGY REPORT    Patient Name: Ekta Andrade: [id number]  Path Number: [id number]  [address] [place] [address]. [place], [address] [place] [address] [facility]  [phone]  Fax: [phone] INCLUDING BMI REVIEWED WITH PATIENT  Labs reviewed as noted above   Discussed with patient        ASSESSMENT / Raheem Seaman was seen today for hashimoto's thyroiditis. Diagnoses and all orders for this visit:    Chronic urticaria  stable  Hx of syncope    Hashimoto's disease    Hypothyroidism due to Hashimoto's thyroiditis  Renew levothyroxine   -     CBC; Future  -     Comprehensive Metabolic Panel; Future  -     TSH with Reflex; Future  -     levothyroxine (SYNTHROID) 150 MCG tablet;  Take 1 tablet by mouth Daily    Anxiety  Sees dr Twin Abarca  Bipolar 2 disorder ([facility] [address]               SALIENT  ACTIONS TODAYS VISIT       Today's office visit 12 [address] Oneill    ---- Discussed use, benefit, and side effects of prescribed medications. [x] yes    All patient questions answered. Patient voiced understanding. Patient given educational materials - see patient instructions  [] yes         Medications Discontinued During This Encounter   Medication Reason    levothyroxine (SYNTHROID) 125 MCG tablet     doxepin (SINEQUAN) 10 MG capsule     levocetirizine (XYZAL) 5 MG tablet     Omalizumab (XOLAIR SC)     zaleplon (SONATA) 10 MG capsule     promethazine (PHENERGAN) 12.5 MG tablet LIST CLEANUP    levothyroxine (SYNTHROID) 150 MCG tablet REORDER        Orders Placed This Encounter   Procedures    CBC     Standing Status:   Future     Standing Expiration Date:   6/21/2023    Comprehensive Metabolic Panel     Standing Status:   Future     Standing Expiration Date:   6/21/2023    TSH with Reflex     Standing Status:   Future     Standing Expiration Date:   6/21/2023        There are no Patient Instructions on file for this visit. Medication List          Accurate as of June 21, 2022 11:59 PM. If you have any questions, ask your nurse or doctor. CHANGE how you take these medications    levothyroxine 150 MCG tablet  Commonly known as: SYNTHROID  Take 1 tablet by mouth Daily  What changed:   · how much to take  · how to take this  · when to take this  · Another medication with the same name was removed. Continue taking this medication, and follow the directions you see here.   Changed by: Maura Cleveland MD        CONTINUE taking these medications    diphenhydrAMINE 50 MG tablet  Commonly known as: BENADRYL     escitalopram 10 MG tablet  Commonly known as: LEXAPRO     ibuprofen 600 MG tablet  Commonly known as: ADVIL;MOTRIN     LORazepam 1 MG tablet  Commonly known as: ATIVAN     Melatonin 10 MG Tabs     mirtazapine 30 MG tablet  Commonly known as: REMERON     OXcarbazepine 600 MG tablet  Commonly known as: TRILEPTAL        STOP taking these medications doxepin 10 MG capsule  Commonly known as: SINEQUAN  Stopped by: Apolinar Pride MD     levocetirizine 5 MG tablet  Commonly known as: XYZAL  Stopped by: MD Terrence Conner SC  Stopped by: Apolinar Pride MD     zaleplon 10 MG capsule  Commonly known as: SONATA  Stopped by: Apolinar Pride MD           Where to Get Your Medications      You can get these medications from any pharmacy    Bring a paper prescription for each of these medications  · levothyroxine 150 MCG tablet             FOLLOW UP  PLANS     No follow-ups on file. MD AUGUST Conner [address], [address] [place] [address].    Phone [phone]   Fax: [phone]  Answering Service: [phone]

## 2022-09-28 ENCOUNTER — OFFICE VISIT (OUTPATIENT)
Dept: INTERNAL MEDICINE CLINIC | Age: 22
End: 2022-09-28
Payer: MEDICAID

## 2022-09-28 VITALS — SYSTOLIC BLOOD PRESSURE: 118 MMHG | DIASTOLIC BLOOD PRESSURE: 66 MMHG | WEIGHT: 254 LBS | BODY MASS INDEX: 47.99 KG/M2

## 2022-09-28 DIAGNOSIS — E03.8 HYPOTHYROIDISM DUE TO HASHIMOTO'S THYROIDITIS: ICD-10-CM

## 2022-09-28 DIAGNOSIS — F31.81 BIPOLAR 2 DISORDER (HCC): Primary | ICD-10-CM

## 2022-09-28 DIAGNOSIS — E06.3 HYPOTHYROIDISM DUE TO HASHIMOTO'S THYROIDITIS: ICD-10-CM

## 2022-09-28 DIAGNOSIS — E66.01 CLASS 3 SEVERE OBESITY DUE TO EXCESS CALORIES WITHOUT SERIOUS COMORBIDITY WITH BODY MASS INDEX (BMI) OF 45.0 TO 49.9 IN ADULT (HCC): ICD-10-CM

## 2022-09-28 PROBLEM — L50.8 CHRONIC URTICARIA: Status: RESOLVED | Noted: 2018-04-26 | Resolved: 2022-09-28

## 2022-09-28 PROBLEM — E66.813 CLASS 3 SEVERE OBESITY DUE TO EXCESS CALORIES WITH BODY MASS INDEX (BMI) OF 45.0 TO 49.9 IN ADULT: Status: ACTIVE | Noted: 2022-09-28

## 2022-09-28 PROCEDURE — 99214 OFFICE O/P EST MOD 30 MIN: CPT | Performed by: INTERNAL MEDICINE

## 2022-09-28 PROCEDURE — G8417 CALC BMI ABV UP PARAM F/U: HCPCS | Performed by: INTERNAL MEDICINE

## 2022-09-28 PROCEDURE — 1036F TOBACCO NON-USER: CPT | Performed by: INTERNAL MEDICINE

## 2022-09-28 PROCEDURE — G8427 DOCREV CUR MEDS BY ELIG CLIN: HCPCS | Performed by: INTERNAL MEDICINE

## 2022-09-28 RX ORDER — PROMETHAZINE HYDROCHLORIDE 25 MG/1
12.5 TABLET ORAL EVERY 8 HOURS PRN
Qty: 50 TABLET | Refills: 0 | Status: SHIPPED | OUTPATIENT
Start: 2022-09-28

## 2022-09-28 RX ORDER — LIOTHYRONINE SODIUM 5 UG/1
5 TABLET ORAL DAILY
Qty: 90 TABLET | Refills: 3 | Status: SHIPPED | OUTPATIENT
Start: 2022-09-28 | End: 2022-12-27

## 2022-09-28 RX ORDER — PROMETHAZINE HYDROCHLORIDE 25 MG/1
TABLET ORAL
COMMUNITY
Start: 2022-09-14 | End: 2022-09-28 | Stop reason: SDUPTHER

## 2022-09-28 RX ORDER — LORAZEPAM 0.5 MG/1
TABLET ORAL
COMMUNITY
Start: 2022-07-25

## 2022-09-28 RX ORDER — LEVOTHYROXINE SODIUM 0.15 MG/1
150 TABLET ORAL DAILY
Qty: 90 TABLET | Refills: 3 | Status: SHIPPED | OUTPATIENT
Start: 2022-09-28

## 2022-09-28 NOTE — PROGRESS NOTES
Visit Information    Have you changed or started any medications since your last visit including any over-the-counter medicines, vitamins, or herbal medicines? no   Are you having any side effects from any of your medications? -  no  Have you stopped taking any of your medications? Is so, why? -  no    Have you seen any other physician or provider since your last visit? No  Have you had any other diagnostic tests since your last visit? Yes - Records Obtained  Have you been seen in the emergency room and/or had an admission to a hospital since we last saw you? Yes - Records Obtained  Have you had your routine dental cleaning in the past 6 months? no    Have you activated your [employer] account? If not, what are your barriers?  Yes     Patient Care Team:  Rubi Hernandez MD as PCP - General (Internal Medicine)  Rubi Hernandez MD as PCP - [facility]  Arlene David MD as Consulting Physician (Gastroenterology)    Medical History Review  Past Medical, Family, and Social History reviewed and does not contribute to the patient presenting condition    Health Maintenance   Topic Date Due    COVID-19 Vaccine (1) Never done    HPV vaccine (1 - 2-dose series) Never done    HIV screen  Never done    Chlamydia/GC screen  Never done    DTaP/Tdap/Td vaccine (7 - Td or Tdap) 08/16/2021    Flu vaccine (1) 08/01/2022    Depression Monitoring  06/21/2023    Pap smear  04/14/2024    Hepatitis B vaccine  Completed    Hib vaccine  Completed    Varicella vaccine  Completed    Hepatitis C screen  Completed    Hepatitis A vaccine  Aged Out    Meningococcal (ACWY) vaccine  Aged Out    Pneumococcal 0-64 years Vaccine  Aged Out

## 2022-09-28 NOTE — PROGRESS NOTES
liothyronine (CYTOMEL) 5 MCG tablet Take 1 tablet by mouth daily 90 tablet 3    promethazine (PHENERGAN) 25 MG tablet Take 0.5 tablets by mouth every 8 hours as needed for Nausea 50 tablet 0    levothyroxine (SYNTHROID) 150 MCG tablet Take 1 tablet by mouth Daily 90 tablet 3    LORazepam (ATIVAN) 1 MG tablet Take 1 mg by mouth every 6 hours as needed for Anxiety. escitalopram (LEXAPRO) 10 MG tablet Take 10 mg by mouth      OXcarbazepine (TRILEPTAL) 600 MG tablet Take 600 mg by mouth 2 times daily      mirtazapine (REMERON) 30 MG tablet Take 30 mg by mouth nightly      Melatonin 10 MG TABS Take 10 mg by mouth nightly as needed       No current facility-administered medications for this visit. ALLERGIES:      Allergies   Allergen Reactions    Prunus Persica Nausea And Vomiting    Soap Rash     Tide Detergent    Zofran [Ondansetron Hcl] Hives       SOCIAL HISTORY   Reviewed and no change from previous record. Kathleen Sweet  reports that she has never smoked. She has never used smokeless tobacco.    FAMILY HISTORY:    Reviewed and No change from previous visit    REVIEW OF SYSTEMS:    Review of Systems        OBJECTIVE      Physical exam           Vitals:    09/28/22 0955   BP: 118/66   Site: Left Upper Arm   Weight: 254 lb (115.2 kg)         Physical Exam   Vitals:  /66 (Site: Left Upper Arm)   Wt 254 lb (115.2 kg)   BMI 47.99 kg/m²                 Body mass index is 47.99 kg/m².      Vitals:    09/28/22 0955   BP: 118/66   Site: Left Upper Arm   Weight: 254 lb (115.2 kg)       General -alert, well appearing, and in no distress  Skin - normal coloration and turgor, no rashes,no suspicious skin lesions noted  Eyes - pupils equal and reactive, extraocular eye movementsintact  Ears - bilateral TM's and external ear canals normal  Nose - normal and patent, no erythema, discharge or polyps  Mouth - mucous membranes are moist, pharynx normal without lesions  Neck - supple, no significant adenopathy  Lymphatics - no palpable lymphadenopathy, no hepatosplenomegaly    Chest - clear to auscultation, no wheezes, rales or rhonchi, symmetric air entry    Heart - normal rate, regular rhythm, no murmurs, rubs, clicks or gallops    Extremities - peripheral pulses normal, no pedal edema       Abdomen - soft, nontender, nondistended, no masses or organomegaly    Back - full range of motion, notenderness, palpable spasm or pain on motion    Neurological - alert, oriented, normal speech, no focal sensory or motor deficit  Musculoskeletal - no joint tenderness, deformity or swelling                DIAGNOSTICS / INSERTS / IMAGES    [x] Reviewed       Labs      URINE ANALYSIS: No results found for: Genia Pacheco     CBC:No results found for: WBC, HGB, PLT     BMP:  No results found for: NA, K, CL, CO2, BUN, CREATININE, GLUCOSE     No results found for: LDLC  No results found for: LABA1C  No results found for: POCGLU   .     LIVER PROFILE:  Lab Results   Component Value Date/Time    ALT 6 11/27/2018 11:10 AM    AST 8 11/27/2018 11:10 AM    PROT 7.0 11/27/2018 11:10 AM    BILITOT 0.17 11/27/2018 11:10 AM    BILIDIR <0.08 11/27/2018 11:10 AM    LABALBU 3.8 11/27/2018 11:10 AM          Results for orders placed or performed during the hospital encounter of 04/14/21   GYN Cytology   Result Value Ref Range    Cytology Report       INTERPRETATION    Cervical material, (ThinPrep vial, Imaging-assisted review):  Specimen Adequacy:       Satisfactory for evaluation.       - Endocervical/transformation zone component present. Descriptive Diagnosis:       Negative for intraepithelial lesion or malignancy.           Cytotechnologist:   Lincoln UPTON(ASCP)  **Electronically Signed Out**  ey/4/19/2021            Source:  1: Cervical material, (ThinPrep vial, Imaging-assisted review)    Clinical History  Z01.419 Routine gyn exam without abnormal findings  High Risk HPV DNA testing is requested if the diagnosis is ASC-US    GYNECOLOGIC CYTOLOGY REPORT    Patient Name: Dacia Self: [id number]  Path Number: [id number]  [address] [place] [address], [facility]. [facility], [address]  [phone]  Fax: [phone] INCLUDING BMI REVIEWED WITH PATIENT  Labs reviewed as noted above   Discussed with patient        ASSESSMENT / PLAN       Diagnoses and all orders for this visit:    Bipolar 2 disorder ([facility] [address]    Hypothyroidism due to Hashimoto's thyroiditis  Will evaluate   Poor control symptoms   -     levothyroxine (SYNTHROID) 150 MCG tablet; Take 1 tablet by mouth Daily  -     T3, Free; Future  -     T4, Free; Future  -     TSH; Future    Other orders  -     liothyronine (CYTOMEL) 5 MCG tablet; Take 1 tablet by mouth daily  -     promethazine (PHENERGAN) 25 MG tablet; Take 0.5 tablets by mouth every 8 hours as needed for Nausea               [facility] VISIT       Today's office visit Albin Woodard    ---    Dr. Demi Art" instructions for dieting for weight loss . Reduce  calories   by 10%at   time . Keep other 2 meals light and use  healthy choices. Goal is to reduce daily caloric intake by   kcals a day . No snacks in between meals . Late evening snack ok if early dinner . Avoid fried foods . Avoid   sugar , desserts, candies , chocolate , excess alcoholand coke  etc .      Increase fruit and vegetables with meals . Eat reasonable dairy , nuts , and resins . Weigh every Sunday and record . Try to loose 2 pounds per month . Bring and discuss weight chart with me each visit . Advised protein shake with breakfast         -            Discussed use, benefit, and side effects of prescribed medications. [x] yes    All patient questions answered. Patient voiced understanding.      Patient given educational materials - see patient instructions  [] yes         Medications Discontinued During This Encounter   Medication Reason    diphenhydrAMINE (BENADRYL) 50 MG tablet     ibuprofen (ADVIL;MOTRIN) 600 MG tablet     levothyroxine (SYNTHROID) 150 MCG tablet REORDER    promethazine (PHENERGAN) 25 MG tablet REORDER        Orders Placed This Encounter   Procedures    T3, Free     Standing Status:   Future     Standing Expiration Date:   9/28/2023    T4, Free     Standing Status:   Future     Standing Expiration Date:   9/28/2023    TSH     Standing Status:   Future     Standing Expiration Date:   9/28/2023        There are no Patient Instructions on file for this visit. Medication List            Accurate as of September 28, 2022 10:28 AM. If you have any questions, ask your nurse or doctor. START taking these medications      liothyronine 5 MCG tablet  Commonly known as: CYTOMEL  Take 1 tablet by mouth daily  Started by: Monica Ruiz MD            CHANGE how you take these medications      promethazine 25 MG tablet  Commonly known as: PHENERGAN  Take 0.5 tablets by mouth every 8 hours as needed for Nausea  What changed: See the new instructions. Changed by: Monica Ruiz MD            CONTINUE taking these medications      escitalopram 10 MG tablet  Commonly known as: LEXAPRO     levothyroxine 150 MCG tablet  Commonly known as: SYNTHROID  Take 1 tablet by mouth Daily     * LORazepam 0.5 MG tablet  Commonly known as: ATIVAN     * LORazepam 1 MG tablet  Commonly known as: ATIVAN     Melatonin 10 MG Tabs     mirtazapine 30 MG tablet  Commonly known as: REMERON     OXcarbazepine 600 MG tablet  Commonly known as: TRILEPTAL           * This list has 2 medication(s) that are the same as other medications prescribed for you. Read the directions carefully, and ask your doctor or other care provider to review them with you.                 STOP taking these medications      diphenhydrAMINE 50 MG tablet  Commonly known as: BENADRYL  Stopped by: Monica Ruiz MD     ibuprofen 600 MG tablet  Commonly known as:

## 2023-02-28 RX ORDER — PROMETHAZINE HYDROCHLORIDE 25 MG/1
TABLET ORAL
Qty: 50 TABLET | Refills: 0 | Status: SHIPPED | OUTPATIENT
Start: 2023-02-28

## 2023-02-28 NOTE — TELEPHONE ENCOUNTER
Prashanth Chu is calling to request a refill on the following medication(s):    Last Visit Date (If Applicable):  1/15/0122    Next Visit Date:    Visit date not found    Medication Request:  Requested Prescriptions     Pending Prescriptions Disp Refills    promethazine (PHENERGAN) 25 MG tablet [Pharmacy Med Name: PROMETHAZINE 25 MG TABLET] 50 tablet 0     Sig: take 1/2 tablet by mouth every 8 hours if needed for nausea

## 2023-06-21 ENCOUNTER — OFFICE VISIT (OUTPATIENT)
Dept: INTERNAL MEDICINE CLINIC | Age: 23
End: 2023-06-21
Payer: MEDICAID

## 2023-06-21 VITALS
OXYGEN SATURATION: 98 % | BODY MASS INDEX: 45.88 KG/M2 | WEIGHT: 243 LBS | DIASTOLIC BLOOD PRESSURE: 68 MMHG | HEIGHT: 61 IN | HEART RATE: 91 BPM | SYSTOLIC BLOOD PRESSURE: 106 MMHG

## 2023-06-21 DIAGNOSIS — F31.81 BIPOLAR 2 DISORDER (HCC): ICD-10-CM

## 2023-06-21 DIAGNOSIS — T78.3XXS ANGIOEDEMA, SEQUELA: ICD-10-CM

## 2023-06-21 DIAGNOSIS — L50.1 CHRONIC IDIOPATHIC URTICARIA: Primary | ICD-10-CM

## 2023-06-21 PROCEDURE — 1036F TOBACCO NON-USER: CPT | Performed by: INTERNAL MEDICINE

## 2023-06-21 PROCEDURE — G8417 CALC BMI ABV UP PARAM F/U: HCPCS | Performed by: INTERNAL MEDICINE

## 2023-06-21 PROCEDURE — G8427 DOCREV CUR MEDS BY ELIG CLIN: HCPCS | Performed by: INTERNAL MEDICINE

## 2023-06-21 PROCEDURE — 99214 OFFICE O/P EST MOD 30 MIN: CPT | Performed by: INTERNAL MEDICINE

## 2023-06-21 RX ORDER — EPINEPHRINE 0.3 MG/.3ML
0.3 INJECTION SUBCUTANEOUS ONCE
Qty: 0.3 ML | Refills: 0 | Status: SHIPPED | OUTPATIENT
Start: 2023-06-21 | End: 2023-06-21

## 2023-06-21 RX ORDER — ESCITALOPRAM OXALATE 20 MG/1
TABLET ORAL
COMMUNITY
Start: 2023-06-18

## 2023-06-21 RX ORDER — FAMOTIDINE 20 MG/1
20 TABLET, FILM COATED ORAL 2 TIMES DAILY
Qty: 60 TABLET | Refills: 3 | Status: SHIPPED | OUTPATIENT
Start: 2023-06-21

## 2023-06-21 SDOH — ECONOMIC STABILITY: FOOD INSECURITY: WITHIN THE PAST 12 MONTHS, YOU WORRIED THAT YOUR FOOD WOULD RUN OUT BEFORE YOU GOT MONEY TO BUY MORE.: NEVER TRUE

## 2023-06-21 SDOH — ECONOMIC STABILITY: INCOME INSECURITY: HOW HARD IS IT FOR YOU TO PAY FOR THE VERY BASICS LIKE FOOD, HOUSING, MEDICAL CARE, AND HEATING?: NOT HARD AT ALL

## 2023-06-21 SDOH — ECONOMIC STABILITY: FOOD INSECURITY: WITHIN THE PAST 12 MONTHS, THE FOOD YOU BOUGHT JUST DIDN'T LAST AND YOU DIDN'T HAVE MONEY TO GET MORE.: NEVER TRUE

## 2023-06-21 SDOH — ECONOMIC STABILITY: HOUSING INSECURITY
IN THE LAST 12 MONTHS, WAS THERE A TIME WHEN YOU DID NOT HAVE A STEADY PLACE TO SLEEP OR SLEPT IN A SHELTER (INCLUDING NOW)?: NO

## 2023-06-21 ASSESSMENT — PATIENT HEALTH QUESTIONNAIRE - PHQ9
SUM OF ALL RESPONSES TO PHQ QUESTIONS 1-9: 0
SUM OF ALL RESPONSES TO PHQ QUESTIONS 1-9: 0
4. FEELING TIRED OR HAVING LITTLE ENERGY: 0
5. POOR APPETITE OR OVEREATING: 0
2. FEELING DOWN, DEPRESSED OR HOPELESS: 0
8. MOVING OR SPEAKING SO SLOWLY THAT OTHER PEOPLE COULD HAVE NOTICED. OR THE OPPOSITE, BEING SO FIGETY OR RESTLESS THAT YOU HAVE BEEN MOVING AROUND A LOT MORE THAN USUAL: 0
7. TROUBLE CONCENTRATING ON THINGS, SUCH AS READING THE NEWSPAPER OR WATCHING TELEVISION: 0
1. LITTLE INTEREST OR PLEASURE IN DOING THINGS: 0
9. THOUGHTS THAT YOU WOULD BE BETTER OFF DEAD, OR OF HURTING YOURSELF: 0
3. TROUBLE FALLING OR STAYING ASLEEP: 0
10. IF YOU CHECKED OFF ANY PROBLEMS, HOW DIFFICULT HAVE THESE PROBLEMS MADE IT FOR YOU TO DO YOUR WORK, TAKE CARE OF THINGS AT HOME, OR GET ALONG WITH OTHER PEOPLE: 0
6. FEELING BAD ABOUT YOURSELF - OR THAT YOU ARE A FAILURE OR HAVE LET YOURSELF OR YOUR FAMILY DOWN: 0
SUM OF ALL RESPONSES TO PHQ9 QUESTIONS 1 & 2: 0
SUM OF ALL RESPONSES TO PHQ QUESTIONS 1-9: 0
SUM OF ALL RESPONSES TO PHQ QUESTIONS 1-9: 0

## 2023-06-21 NOTE — PROGRESS NOTES
this visit:    Chronic idiopathic urticaria  -     Cancel: Austin Lance MD, Allergy & Immunology, [place]  -     famotidine (PEPCID) 20 MG tablet; Take 1 tablet by mouth 2 times daily  -     omalizumab 150 MG injection; Inject 150 mg into the skin every 28 days  -     Austin Lance MD, All[facility], [place]  -     EPINEPHrine (EPIPEN 2-EDOUARD) 0.3 MG/0.3ML SOAJ injection; Inject 0.3 mLs into the skin once for 1 dose Use as directed for allergic reaction    Bipolar 2 disorder (HCC)    Angioedema, sequela  -     Cancel: Austin Lance MD, All[facility]ology, [place]  -     famotidine (PEPCID) 20 MG tablet; Take 1 tablet by mouth 2 times daily  -     omalizumab 150 MG injection; Inject 150 mg into the skin every 28 days  -     Austin Lance MD, All[facility], [place]  -     EPINEPHrine (EPIPEN 2-EDOUARD) 0.3 MG/0.3ML SOAJ injection;  Inject 0.3 mLs into the skin once for 1 dose Use as directed for allergic reaction           Follow up in: Sienna Garcia MD

## 2023-08-01 ENCOUNTER — OFFICE VISIT (OUTPATIENT)
Dept: INTERNAL MEDICINE CLINIC | Age: 23
End: 2023-08-01
Payer: MEDICAID

## 2023-08-01 VITALS
SYSTOLIC BLOOD PRESSURE: 118 MMHG | OXYGEN SATURATION: 98 % | HEART RATE: 91 BPM | DIASTOLIC BLOOD PRESSURE: 64 MMHG | BODY MASS INDEX: 45.69 KG/M2 | HEIGHT: 61 IN | WEIGHT: 242 LBS

## 2023-08-01 DIAGNOSIS — F31.81 BIPOLAR 2 DISORDER (HCC): ICD-10-CM

## 2023-08-01 DIAGNOSIS — E06.3 HYPOTHYROIDISM DUE TO HASHIMOTO'S THYROIDITIS: Primary | ICD-10-CM

## 2023-08-01 DIAGNOSIS — E03.8 HYPOTHYROIDISM DUE TO HASHIMOTO'S THYROIDITIS: Primary | ICD-10-CM

## 2023-08-01 DIAGNOSIS — L50.1 CHRONIC IDIOPATHIC URTICARIA: ICD-10-CM

## 2023-08-01 DIAGNOSIS — E66.01 CLASS 3 SEVERE OBESITY DUE TO EXCESS CALORIES WITHOUT SERIOUS COMORBIDITY WITH BODY MASS INDEX (BMI) OF 45.0 TO 49.9 IN ADULT (HCC): ICD-10-CM

## 2023-08-01 PROCEDURE — 99213 OFFICE O/P EST LOW 20 MIN: CPT | Performed by: INTERNAL MEDICINE

## 2023-08-01 PROCEDURE — 1036F TOBACCO NON-USER: CPT | Performed by: INTERNAL MEDICINE

## 2023-08-01 PROCEDURE — G8427 DOCREV CUR MEDS BY ELIG CLIN: HCPCS | Performed by: INTERNAL MEDICINE

## 2023-08-01 PROCEDURE — G8417 CALC BMI ABV UP PARAM F/U: HCPCS | Performed by: INTERNAL MEDICINE

## 2023-08-01 RX ORDER — DOXEPIN HYDROCHLORIDE 10 MG/1
CAPSULE ORAL
COMMUNITY
Start: 2023-07-26

## 2023-08-01 RX ORDER — MONTELUKAST SODIUM 10 MG/1
TABLET ORAL
COMMUNITY
Start: 2023-07-31

## 2023-08-01 NOTE — PROGRESS NOTES
Visit Information    Have you changed or started any medications since your last visit including any over-the-counter medicines, vitamins, or herbal medicines? no   Are you having any side effects from any of your medications? -  no  Have you stopped taking any of your medications? Is so, why? -  no    Have you seen any other physician or provider since your last visit? No  Have you had any other diagnostic tests since your last visit? No  Have you been seen in the emergency room and/or had an admission to a hospital since we last saw you? No  Have you had your routine dental cleaning in the past 6 months? no    Have you activated your [employer] account? If not, what are your barriers?  Yes     Patient Care Team:  Kendy Marte MD as PCP - General (Internal Medicine)  Kendy Marte MD as PCP - Empaneled Provider  Rob Garcia MD as Consulting Physician (Gastroenterology)    Medical History Review  Past Medical, Family, and Social History reviewed and does contribute to the patient presenting condition    Health Maintenance   Topic Date Due    COVID-19 Vaccine (1) Never done    HPV vaccine (1 - 2-dose series) Never done    HIV screen  Never done    Chlamydia/GC screen  Never done    DTaP/Tdap/Td vaccine (7 - Td or Tdap) 08/16/2021    Flu vaccine (1) 08/01/2023    Pap smear  04/14/2024    Depression Monitoring  06/21/2024    Hib vaccine  Completed    Varicella vaccine  Completed    Hepatitis C screen  Completed    Hepatitis A vaccine  Aged Out    Meningococcal (ACWY) vaccine  Aged Out    Pneumococcal 0-64 years Vaccine  Aged Out    Hepatitis B vaccine  Discontinued    Depression Screen  Discontinued     SUBJECTIVE:  Rosiekaela Jose Luis is a 23 y.o. female patient who  comes for complaints of   Chief Complaint   Patient presents with    Urticaria         Idiopathic urticaria  Started Xolair last month  Was able to see allergist  Taking pepcid, singulair    Biploar disoder and depression  Following Psych-

## 2023-09-20 RX ORDER — LIOTHYRONINE SODIUM 5 UG/1
5 TABLET ORAL DAILY
Qty: 90 TABLET | Refills: 3 | Status: SHIPPED | OUTPATIENT
Start: 2023-09-20

## 2023-10-16 DIAGNOSIS — L50.1 CHRONIC IDIOPATHIC URTICARIA: ICD-10-CM

## 2023-10-16 DIAGNOSIS — T78.3XXS ANGIOEDEMA, SEQUELA: ICD-10-CM

## 2023-10-17 RX ORDER — FAMOTIDINE 20 MG/1
20 TABLET, FILM COATED ORAL 2 TIMES DAILY
Qty: 60 TABLET | Refills: 3 | Status: SHIPPED | OUTPATIENT
Start: 2023-10-17

## 2023-10-24 ENCOUNTER — HOSPITAL ENCOUNTER (OUTPATIENT)
Age: 23
Setting detail: SPECIMEN
Discharge: HOME | End: 2023-10-24

## 2023-10-24 DIAGNOSIS — E06.3 HYPOTHYROIDISM DUE TO HASHIMOTO'S THYROIDITIS: ICD-10-CM

## 2023-10-24 DIAGNOSIS — E03.8 HYPOTHYROIDISM DUE TO HASHIMOTO'S THYROIDITIS: ICD-10-CM

## 2023-10-24 LAB — TSH SERPL DL<=0.05 MIU/L-ACNC: 5.26 UIU/ML (ref 0.3–5)

## 2023-11-03 ENCOUNTER — APPOINTMENT (OUTPATIENT)
Dept: CT IMAGING | Age: 23
End: 2023-11-03
Payer: MEDICAID

## 2023-11-03 ENCOUNTER — OFFICE VISIT (OUTPATIENT)
Dept: INTERNAL MEDICINE CLINIC | Age: 23
End: 2023-11-03
Payer: MEDICAID

## 2023-11-03 ENCOUNTER — HOSPITAL ENCOUNTER (EMERGENCY)
Age: 23
Discharge: HOME | End: 2023-11-03
Attending: EMERGENCY MEDICINE
Payer: MEDICAID

## 2023-11-03 VITALS
WEIGHT: 250 LBS | BODY MASS INDEX: 47.2 KG/M2 | OXYGEN SATURATION: 98 % | DIASTOLIC BLOOD PRESSURE: 70 MMHG | HEART RATE: 115 BPM | HEIGHT: 61 IN | SYSTOLIC BLOOD PRESSURE: 120 MMHG

## 2023-11-03 VITALS
HEIGHT: 61 IN | OXYGEN SATURATION: 97 % | HEART RATE: 96 BPM | RESPIRATION RATE: 21 BRPM | WEIGHT: 250 LBS | TEMPERATURE: 97.8 F | SYSTOLIC BLOOD PRESSURE: 97 MMHG | DIASTOLIC BLOOD PRESSURE: 76 MMHG | BODY MASS INDEX: 47.2 KG/M2

## 2023-11-03 DIAGNOSIS — E66.01 CLASS 3 SEVERE OBESITY DUE TO EXCESS CALORIES WITHOUT SERIOUS COMORBIDITY WITH BODY MASS INDEX (BMI) OF 45.0 TO 49.9 IN ADULT (HCC): Primary | ICD-10-CM

## 2023-11-03 DIAGNOSIS — R55 SYNCOPE AND COLLAPSE: Primary | ICD-10-CM

## 2023-11-03 DIAGNOSIS — E06.3 HYPOTHYROIDISM DUE TO HASHIMOTO'S THYROIDITIS: ICD-10-CM

## 2023-11-03 DIAGNOSIS — E03.8 HYPOTHYROIDISM DUE TO HASHIMOTO'S THYROIDITIS: ICD-10-CM

## 2023-11-03 DIAGNOSIS — F31.81 BIPOLAR 2 DISORDER (HCC): ICD-10-CM

## 2023-11-03 LAB
ANION GAP SERPL CALCULATED.3IONS-SCNC: 11 MMOL/L (ref 9–17)
BASOPHILS # BLD: 0 K/UL (ref 0–0.2)
BASOPHILS NFR BLD: 0 % (ref 0–2)
BUN SERPL-MCNC: 9 MG/DL (ref 6–20)
CALCIUM SERPL-MCNC: 9.7 MG/DL (ref 8.6–10.4)
CHLORIDE SERPL-SCNC: 101 MMOL/L (ref 98–107)
CO2 SERPL-SCNC: 24 MMOL/L (ref 20–31)
CREAT SERPL-MCNC: 0.6 MG/DL (ref 0.5–0.9)
EKG ATRIAL RATE: 97 BPM
EKG P AXIS: 29 DEGREES
EKG P-R INTERVAL: 154 MS
EKG Q-T INTERVAL: 368 MS
EKG QRS DURATION: 82 MS
EKG QTC CALCULATION (BAZETT): 467 MS
EKG R AXIS: -4 DEGREES
EKG T AXIS: 1 DEGREES
EKG VENTRICULAR RATE: 97 BPM
EOSINOPHIL # BLD: 0 K/UL (ref 0–0.4)
EOSINOPHILS RELATIVE PERCENT: 0 % (ref 0–4)
ERYTHROCYTE [DISTWIDTH] IN BLOOD BY AUTOMATED COUNT: 15.6 % (ref 11.5–14.9)
GFR SERPL CREATININE-BSD FRML MDRD: >60 ML/MIN/1.73M2
GLUCOSE SERPL-MCNC: 98 MG/DL (ref 70–99)
HCG SERPL QL: NEGATIVE
HCT VFR BLD AUTO: 41.5 % (ref 36–46)
HGB BLD-MCNC: 13.3 G/DL (ref 12–16)
LYMPHOCYTES NFR BLD: 1.98 K/UL (ref 1–4.8)
LYMPHOCYTES RELATIVE PERCENT: 18 % (ref 24–44)
MCH RBC QN AUTO: 25.7 PG (ref 26–34)
MCHC RBC AUTO-ENTMCNC: 32.2 G/DL (ref 31–37)
MCV RBC AUTO: 79.8 FL (ref 80–100)
MONOCYTES NFR BLD: 0.88 K/UL (ref 0.1–1.3)
MONOCYTES NFR BLD: 8 % (ref 1–7)
MORPHOLOGY: NORMAL
NEUTROPHILS NFR BLD: 74 % (ref 36–66)
NEUTS SEG NFR BLD: 8.14 K/UL (ref 1.3–9.1)
PLATELET # BLD AUTO: 331 K/UL (ref 150–450)
PMV BLD AUTO: 8.4 FL (ref 6–12)
POTASSIUM SERPL-SCNC: 4.2 MMOL/L (ref 3.7–5.3)
RBC # BLD AUTO: 5.2 M/UL (ref 4–5.2)
SODIUM SERPL-SCNC: 136 MMOL/L (ref 135–144)
WBC OTHER # BLD: 11 K/UL (ref 3.5–11)

## 2023-11-03 PROCEDURE — 6360000002 HC RX W HCPCS: Performed by: EMERGENCY MEDICINE

## 2023-11-03 PROCEDURE — 96374 THER/PROPH/DIAG INJ IV PUSH: CPT

## 2023-11-03 PROCEDURE — 93005 ELECTROCARDIOGRAM TRACING: CPT | Performed by: EMERGENCY MEDICINE

## 2023-11-03 PROCEDURE — 85025 COMPLETE CBC W/AUTO DIFF WBC: CPT

## 2023-11-03 PROCEDURE — 36415 COLL VENOUS BLD VENIPUNCTURE: CPT

## 2023-11-03 PROCEDURE — 99284 EMERGENCY DEPT VISIT MOD MDM: CPT

## 2023-11-03 PROCEDURE — 84703 CHORIONIC GONADOTROPIN ASSAY: CPT

## 2023-11-03 PROCEDURE — 70450 CT HEAD/BRAIN W/O DYE: CPT

## 2023-11-03 PROCEDURE — 80048 BASIC METABOLIC PNL TOTAL CA: CPT

## 2023-11-03 PROCEDURE — G8417 CALC BMI ABV UP PARAM F/U: HCPCS | Performed by: INTERNAL MEDICINE

## 2023-11-03 PROCEDURE — 93010 ELECTROCARDIOGRAM REPORT: CPT | Performed by: INTERNAL MEDICINE

## 2023-11-03 PROCEDURE — 1036F TOBACCO NON-USER: CPT | Performed by: INTERNAL MEDICINE

## 2023-11-03 PROCEDURE — 2580000003 HC RX 258: Performed by: EMERGENCY MEDICINE

## 2023-11-03 PROCEDURE — G8484 FLU IMMUNIZE NO ADMIN: HCPCS | Performed by: INTERNAL MEDICINE

## 2023-11-03 PROCEDURE — 99213 OFFICE O/P EST LOW 20 MIN: CPT | Performed by: INTERNAL MEDICINE

## 2023-11-03 PROCEDURE — G8427 DOCREV CUR MEDS BY ELIG CLIN: HCPCS | Performed by: INTERNAL MEDICINE

## 2023-11-03 RX ORDER — METOCLOPRAMIDE HYDROCHLORIDE 5 MG/ML
10 INJECTION INTRAMUSCULAR; INTRAVENOUS ONCE
Status: COMPLETED | OUTPATIENT
Start: 2023-11-03 | End: 2023-11-03

## 2023-11-03 RX ORDER — 0.9 % SODIUM CHLORIDE 0.9 %
1000 INTRAVENOUS SOLUTION INTRAVENOUS ONCE
Status: COMPLETED | OUTPATIENT
Start: 2023-11-03 | End: 2023-11-03

## 2023-11-03 RX ORDER — LIOTHYRONINE SODIUM 5 UG/1
5 TABLET ORAL 3 TIMES DAILY
Qty: 90 TABLET | Refills: 1 | Status: SHIPPED | OUTPATIENT
Start: 2023-11-03

## 2023-11-03 RX ADMIN — METOCLOPRAMIDE 10 MG: 5 INJECTION, SOLUTION INTRAMUSCULAR; INTRAVENOUS at 14:09

## 2023-11-03 RX ADMIN — SODIUM CHLORIDE 1000 ML: 9 INJECTION, SOLUTION INTRAVENOUS at 14:09

## 2023-11-03 ASSESSMENT — ENCOUNTER SYMPTOMS
ABDOMINAL PAIN: 0
CONSTIPATION: 0
DIARRHEA: 0
CHEST TIGHTNESS: 0
SORE THROAT: 0
BACK PAIN: 0
NAUSEA: 1
COUGH: 0
EYE REDNESS: 0
SHORTNESS OF BREATH: 0
EYE PAIN: 0
VOMITING: 0

## 2023-11-03 ASSESSMENT — PATIENT HEALTH QUESTIONNAIRE - PHQ9
SUM OF ALL RESPONSES TO PHQ QUESTIONS 1-9: 0
2. FEELING DOWN, DEPRESSED OR HOPELESS: 0
1. LITTLE INTEREST OR PLEASURE IN DOING THINGS: 0
SUM OF ALL RESPONSES TO PHQ QUESTIONS 1-9: 0
SUM OF ALL RESPONSES TO PHQ9 QUESTIONS 1 & 2: 0
SUM OF ALL RESPONSES TO PHQ QUESTIONS 1-9: 0
SUM OF ALL RESPONSES TO PHQ QUESTIONS 1-9: 0

## 2023-11-03 ASSESSMENT — PAIN SCALES - GENERAL: PAINLEVEL_OUTOF10: 3

## 2023-11-03 ASSESSMENT — PAIN - FUNCTIONAL ASSESSMENT: PAIN_FUNCTIONAL_ASSESSMENT: 0-10

## 2023-11-03 NOTE — ED PROVIDER NOTES
Wayne Ngo I, [place]  11/03/23 1942
Cranial Nerves: No cranial nerve deficit. Coordination: Coordination normal.         MEDICAL DECISION MAKING:   Patient with a PMH of neurocardiogenic syncope and hypothyroidism presenting after a syncopal episode today with head injury. On arrival the patient is non-toxic appearing with reassuring vitals, normal neuro exam. She does have a palpable deformity on the back of the skull which she says is not usually there, therefore she does not meet [place] head ct criteria and I obtained head CT with no acute abnormalities. Basic labs reassuring. I think arrhyhtmia is unlikely. EKG shows NSR with no interval abnormality abnormality such as QTc prolongation or delta waves to suggest WPW. No findings to suggest Brugada Syndrome (such as RBBB with ST elevation in V1 and V2). Cardiac monitoring in the ED revealed no tachyarrhythmias or bradyarrhythmias. Patient has known neurocardiogenic syncope so I think this explains her syncope. She is feeling better after IVF, dc home. CRITICAL CARE:       PROCEDURES:    Procedures    DIAGNOSTIC RESULTS   EKG: All EKG's are interpreted by the Emergency Department Physician who either signs or Co-signs this chart inthe absence of a cardiologist.      RADIOLOGY:All plain film, CT, MRI, and formal ultrasound images (except ED bedside ultrasound) are read by the radiologist, see reports below, unless otherwise noted in MDM or here. CT HEAD WO CONTRAST   Final Result   Scalp soft tissue swelling. No acute traumatic intracranial abnormality           LABS: All lab results were reviewed by myself, and all abnormals are listed below.   Labs Reviewed   CBC WITH AUTO DIFFERENTIAL - Abnormal; Notable for the following components:       Result Value    MCV 79.8 (*)     MCH 25.7 (*)     RDW 15.6 (*)     Neutrophils % 74 (*)     Lymphocytes % 18 (*)     Monocytes % 8 (*)     All other components within normal limits   BASIC METABOLIC PANEL   HCG, SERUM, QUALITATIVE

## 2023-11-03 NOTE — PROGRESS NOTES
Visit Information    Have you changed or started any medications since your last visit including any over-the-counter medicines, vitamins, or herbal medicines? no   Are you having any side effects from any of your medications? -  no  Have you stopped taking any of your medications? Is so, why? -  no    Have you seen any other physician or provider since your last visit? Yes - Records Obtained  Have you had any other diagnostic tests since your last visit? No  Have you been seen in the emergency room and/or had an admission to a hospital since we last saw you? No  Have you had your routine dental cleaning in the past 6 months? no    Have you activated your e[employer] account? If not, what are your barriers?  Yes     Patient Care Team:  Cammie Baxter MD as PCP - General (Internal Medicine)  Cammie Baxter MD as PCP - Empaneled Provider  Stormy Kehr, MD as Consulting Physician (Gastroenterology)    Medical History Review  Past Medical, Family, and Social History reviewed and does not contribute to the patient presenting condition    Health Maintenance   Topic Date Due    COVID-19 Vaccine (1) Never done    HPV vaccine (1 - 2-dose series) Never done    HIV screen  Never done    Chlamydia/GC screen  Never done    DTaP/Tdap/Td vaccine (7 - Td or Tdap) 08/16/2021    Flu vaccine (1) 08/01/2023    Pap smear  04/14/2024    Depression Monitoring  06/21/2024    Hepatitis B vaccine  Completed    Hib vaccine  Completed    Varicella vaccine  Completed    Hepatitis C screen  Completed    Hepatitis A vaccine  Aged Out    Meningococcal (ACWY) vaccine  Aged Out    Pneumococcal 0-64 years Vaccine  Aged Out    Depression Screen  Discontinued

## 2023-11-03 NOTE — PROGRESS NOTES
OFFICE VISIT  PROGRESS NOTE         Date of patient's visit: 11/3/23  Patient's Name:  Jesenia Duckworth  YOB: 2000       Patient Care Team:  Cammie Batxer MD as PCP - General (Internal Medicine)  Cammie Baxter MD as PCP - Empaneled Provider  Stormy Kehr, MD as Consulting Physician (Gastroenterology)        SUBJECTIVE     HISTORY           History of present illness     Pertinent details  added to ,       Chief Complaint   Patient presents with    Dizziness    Fatigue          Encounter Diagnoses   Name Primary? Class 3 severe obesity due to excess calories without serious comorbidity with body mass index (BMI) of 45.0 to 49.9 in adult [facility]) Yes    Bipolar 2 disorder (HCC)         Symptom / problem          --history obtained from patient. 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  Note        MEDICATIONS:      Current Outpatient Medications   Medication Sig Dispense Refill    famotidine (PEPCID) 20 MG tablet take 1 tablet by mouth twice a day 60 tablet 3    liothyronine (CYTOMEL) 5 MCG tablet take 1 tablet by mouth once daily 90 tablet 3    montelukast (SINGULAIR) 10 MG tablet       doxepin (SINEQUAN) 10 MG capsule take 1 capsule by mouth three times a day if needed      escitalopram (LEXAPRO) 20 MG tablet       omalizumab 150 MG injection Inject 150 mg into the skin every 28 days 1 each 3    promethazine (PHENERGAN) 25 MG tablet take 1/2 tablet by mouth every 8 hours if needed for nausea 50 tablet 0    LORazepam

## 2023-11-08 ENCOUNTER — TELEPHONE (OUTPATIENT)
Dept: INTERNAL MEDICINE CLINIC | Age: 23
End: 2023-11-08

## 2023-11-08 NOTE — TELEPHONE ENCOUNTER
Called to speak with patient, Mother, Chivoearnestine Howard answered the phone. Chivoearnestine Howard is listed on patients communication form. Informed her that upon further review of the forms, it appears this would be something to have filled out by a specialist. Reji Howard confirms patient does not see any specialists and has not since she was a child. Reji Howard stated that when she was being evaluated by specialists, they were receiving no answers. Attempted to verify what/who the forms were to be submitted to. Reji Howard and Salbador Estevez deny knowing and state they just believe it is being submitted to [address] [place] [address] to keep patients insurance. Reji Howard stated she is not worried about SSI benefits and is just worried about patient having insurance. Informed Reji Howard MA will have to discuss further with PCP and verify the next steps. She verbalized understanding.

## 2023-11-09 NOTE — TELEPHONE ENCOUNTER
[facility] for [place] Kocher requesting she return call with contact info the forms need submitted to. Upon reviewing forms in detail with staff, this info is needed to verify the need for the forms. Once information is received, will discuss with Dr. Nirmala Luna.

## 2023-11-13 RX ORDER — PROMETHAZINE HYDROCHLORIDE 25 MG/1
TABLET ORAL
Qty: 50 TABLET | Refills: 0 | Status: SHIPPED | OUTPATIENT
Start: 2023-11-13

## 2023-11-13 NOTE — TELEPHONE ENCOUNTER
Patient returned call with the information.     Forms going to [id number] [facility]    Phone# [phone]    [phone]    No  at the present time

## 2023-11-13 NOTE — TELEPHONE ENCOUNTER
Spoke with Mahsa Costa who stated Juliette oHffman is working on getting the information to submit. Will call office once received. Mahsa Costa was informed the forms will not be completed until this info is received, she verbalized understanding.

## 2023-11-14 NOTE — TELEPHONE ENCOUNTER
Upon further review with multiple staff members, these forms appear to be for disability and are unable to be completed in this office. LVM for pt to return call to discuss in detail. She will need to find a provider who specializes in following for disability claims. room air

## 2023-11-15 NOTE — TELEPHONE ENCOUNTER
Spoke with patient who was informed to find a physician who manages disability claims. Patient verbalized understanding and agrees, she expressed appreciation. Advised her to return call with any questions or concerns.

## 2023-12-15 DIAGNOSIS — E06.3 HYPOTHYROIDISM DUE TO HASHIMOTO'S THYROIDITIS: ICD-10-CM

## 2023-12-15 DIAGNOSIS — E03.8 HYPOTHYROIDISM DUE TO HASHIMOTO'S THYROIDITIS: ICD-10-CM

## 2023-12-15 RX ORDER — LEVOTHYROXINE SODIUM 0.15 MG/1
150 TABLET ORAL DAILY
Qty: 90 TABLET | Refills: 3 | Status: SHIPPED | OUTPATIENT
Start: 2023-12-15

## 2024-02-02 DIAGNOSIS — T78.3XXS ANGIOEDEMA, SEQUELA: ICD-10-CM

## 2024-02-02 DIAGNOSIS — L50.1 CHRONIC IDIOPATHIC URTICARIA: ICD-10-CM

## 2024-02-02 RX ORDER — FAMOTIDINE 20 MG/1
20 TABLET, FILM COATED ORAL 2 TIMES DAILY
Qty: 60 TABLET | Refills: 3 | Status: SHIPPED | OUTPATIENT
Start: 2024-02-02

## 2024-06-01 DIAGNOSIS — T78.3XXS ANGIOEDEMA, SEQUELA: ICD-10-CM

## 2024-06-01 DIAGNOSIS — L50.1 CHRONIC IDIOPATHIC URTICARIA: ICD-10-CM

## 2024-06-04 RX ORDER — FAMOTIDINE 20 MG/1
20 TABLET, FILM COATED ORAL 2 TIMES DAILY
Qty: 60 TABLET | Refills: 3 | Status: SHIPPED | OUTPATIENT
Start: 2024-06-04

## 2024-08-19 RX ORDER — PROMETHAZINE HYDROCHLORIDE 25 MG/1
25 TABLET ORAL EVERY 8 HOURS PRN
Qty: 50 TABLET | Refills: 0 | Status: SHIPPED | OUTPATIENT
Start: 2024-08-19

## 2024-10-31 ENCOUNTER — HOSPITAL ENCOUNTER (OUTPATIENT)
Age: 24
Setting detail: SPECIMEN
Discharge: HOME | End: 2024-10-31

## 2024-10-31 LAB
T3 SERPL-MCNC: 105 NG/DL (ref 80–200)
T4 SERPL-MCNC: 7.6 UG/DL (ref 4.5–11.7)
TSH SERPL DL<=0.05 MIU/L-ACNC: 0.86 UIU/ML (ref 0.27–4.2)

## 2024-11-04 ENCOUNTER — OFFICE VISIT (OUTPATIENT)
Dept: INTERNAL MEDICINE CLINIC | Age: 24
End: 2024-11-04
Payer: MEDICAID

## 2024-11-04 VITALS
BODY MASS INDEX: 47.39 KG/M2 | OXYGEN SATURATION: 98 % | HEIGHT: 61 IN | WEIGHT: 251 LBS | HEART RATE: 88 BPM | SYSTOLIC BLOOD PRESSURE: 116 MMHG | DIASTOLIC BLOOD PRESSURE: 66 MMHG

## 2024-11-04 DIAGNOSIS — Z76.89 ENCOUNTER FOR WEIGHT MANAGEMENT: Primary | ICD-10-CM

## 2024-11-04 DIAGNOSIS — F31.81 BIPOLAR 2 DISORDER (HCC): ICD-10-CM

## 2024-11-04 DIAGNOSIS — E06.3 HYPOTHYROIDISM DUE TO HASHIMOTO'S THYROIDITIS: ICD-10-CM

## 2024-11-04 DIAGNOSIS — E66.01 CLASS 3 SEVERE OBESITY DUE TO EXCESS CALORIES WITHOUT SERIOUS COMORBIDITY WITH BODY MASS INDEX (BMI) OF 45.0 TO 49.9 IN ADULT: ICD-10-CM

## 2024-11-04 DIAGNOSIS — E66.813 CLASS 3 SEVERE OBESITY DUE TO EXCESS CALORIES WITHOUT SERIOUS COMORBIDITY WITH BODY MASS INDEX (BMI) OF 45.0 TO 49.9 IN ADULT: ICD-10-CM

## 2024-11-04 PROCEDURE — 99214 OFFICE O/P EST MOD 30 MIN: CPT | Performed by: INTERNAL MEDICINE

## 2024-11-04 PROCEDURE — G8417 CALC BMI ABV UP PARAM F/U: HCPCS | Performed by: INTERNAL MEDICINE

## 2024-11-04 PROCEDURE — G8427 DOCREV CUR MEDS BY ELIG CLIN: HCPCS | Performed by: INTERNAL MEDICINE

## 2024-11-04 PROCEDURE — 1036F TOBACCO NON-USER: CPT | Performed by: INTERNAL MEDICINE

## 2024-11-04 PROCEDURE — G8484 FLU IMMUNIZE NO ADMIN: HCPCS | Performed by: INTERNAL MEDICINE

## 2024-11-04 RX ORDER — MULTIVIT-MIN/IRON/FOLIC ACID/K 18-600-40
2000 CAPSULE ORAL DAILY
COMMUNITY

## 2024-11-04 RX ORDER — LIOTHYRONINE SODIUM 5 UG/1
5 TABLET ORAL 3 TIMES DAILY
Qty: 90 TABLET | Refills: 1 | Status: SHIPPED | OUTPATIENT
Start: 2024-11-04

## 2024-11-04 RX ORDER — LEVOTHYROXINE SODIUM 150 UG/1
150 TABLET ORAL DAILY
Qty: 90 TABLET | Refills: 3 | Status: SHIPPED | OUTPATIENT
Start: 2024-11-04

## 2024-11-04 RX ORDER — THEANINE 100 MG
CAPSULE ORAL
COMMUNITY

## 2024-11-04 SDOH — ECONOMIC STABILITY: FOOD INSECURITY: WITHIN THE PAST 12 MONTHS, THE FOOD YOU BOUGHT JUST DIDN'T LAST AND YOU DIDN'T HAVE MONEY TO GET MORE.: PATIENT DECLINED

## 2024-11-04 SDOH — ECONOMIC STABILITY: INCOME INSECURITY: HOW HARD IS IT FOR YOU TO PAY FOR THE VERY BASICS LIKE FOOD, HOUSING, MEDICAL CARE, AND HEATING?: PATIENT DECLINED

## 2024-11-04 SDOH — ECONOMIC STABILITY: FOOD INSECURITY: WITHIN THE PAST 12 MONTHS, YOU WORRIED THAT YOUR FOOD WOULD RUN OUT BEFORE YOU GOT MONEY TO BUY MORE.: PATIENT DECLINED

## 2024-11-04 ASSESSMENT — PATIENT HEALTH QUESTIONNAIRE - PHQ9
SUM OF ALL RESPONSES TO PHQ9 QUESTIONS 1 & 2: 1
SUM OF ALL RESPONSES TO PHQ QUESTIONS 1-9: 8
2. FEELING DOWN, DEPRESSED OR HOPELESS: SEVERAL DAYS
SUM OF ALL RESPONSES TO PHQ QUESTIONS 1-9: 8
7. TROUBLE CONCENTRATING ON THINGS, SUCH AS READING THE NEWSPAPER OR WATCHING TELEVISION: SEVERAL DAYS
3. TROUBLE FALLING OR STAYING ASLEEP: SEVERAL DAYS
10. IF YOU CHECKED OFF ANY PROBLEMS, HOW DIFFICULT HAVE THESE PROBLEMS MADE IT FOR YOU TO DO YOUR WORK, TAKE CARE OF THINGS AT HOME, OR GET ALONG WITH OTHER PEOPLE: NOT DIFFICULT AT ALL
SUM OF ALL RESPONSES TO PHQ QUESTIONS 1-9: 8
SUM OF ALL RESPONSES TO PHQ QUESTIONS 1-9: 7
5. POOR APPETITE OR OVEREATING: NOT AT ALL
1. LITTLE INTEREST OR PLEASURE IN DOING THINGS: NOT AT ALL
8. MOVING OR SPEAKING SO SLOWLY THAT OTHER PEOPLE COULD HAVE NOTICED. OR THE OPPOSITE, BEING SO FIGETY OR RESTLESS THAT YOU HAVE BEEN MOVING AROUND A LOT MORE THAN USUAL: NOT AT ALL
4. FEELING TIRED OR HAVING LITTLE ENERGY: NEARLY EVERY DAY
6. FEELING BAD ABOUT YOURSELF - OR THAT YOU ARE A FAILURE OR HAVE LET YOURSELF OR YOUR FAMILY DOWN: SEVERAL DAYS
9. THOUGHTS THAT YOU WOULD BE BETTER OFF DEAD, OR OF HURTING YOURSELF: SEVERAL DAYS

## 2024-11-04 ASSESSMENT — COLUMBIA-SUICIDE SEVERITY RATING SCALE - C-SSRS
1. WITHIN THE PAST MONTH, HAVE YOU WISHED YOU WERE DEAD OR WISHED YOU COULD GO TO SLEEP AND NOT WAKE UP?: NO
2. HAVE YOU ACTUALLY HAD ANY THOUGHTS OF KILLING YOURSELF?: NO
6. HAVE YOU EVER DONE ANYTHING, STARTED TO DO ANYTHING, OR PREPARED TO DO ANYTHING TO END YOUR LIFE?: NO

## 2024-11-04 NOTE — PROGRESS NOTES
OBJECTIVE:  Vitals:    11/04/24 1042   BP: 116/66   Pulse: 88   SpO2: 98%     Body mass index is 47.43 kg/m².        Focal exam:    General exam (except above):  General appearance - well appearing, alert, in no acute distress  Head - Atraumatic, normocephalic  Eyes - EOMI, no jaundice or pallor  Lungs - Lungs clear to auscultation.  No wheezing, rhonchi, rales  Heart - RRR without murmur, gallop, or rubs.  No ectopy  Abdomen - Abdomen soft, non-tender. Bowel sounds normal. No masses, organomegaly  Extremities -No significant edema, or skin discoloration. Good capillary refill.  Neuro - Pt Alert, awake and oriented x 3. No gross focal neurological deficits    ASSESSMENT AND PLAN (MEDICAL DECISION MAKING):    Kimberley was seen today for fatigue and weight management.    Diagnoses and all orders for this visit:    Encounter for weight management  -     CBC; Future  -     Comprehensive Metabolic Panel; Future    Hypothyroidism due to Hashimoto's thyroiditis  -     liothyronine (CYTOMEL) 5 MCG tablet; Take 1 tablet by mouth 3 times daily  -     levothyroxine (SYNTHROID) 150 MCG tablet; Take 1 tablet by mouth daily    Class 3 severe obesity due to excess calories without serious comorbidity with body mass index (BMI) of 45.0 to 49.9 in adult  -     CBC; Future  -     Comprehensive Metabolic Panel; Future    Bipolar 2 disorder (HCC)           Follow up in: 4wk       Cheryl Chong MD

## 2025-01-06 ENCOUNTER — TELEPHONE (OUTPATIENT)
Dept: INTERNAL MEDICINE CLINIC | Age: 25
End: 2025-01-06

## 2025-01-06 NOTE — TELEPHONE ENCOUNTER
----- Message from Doron DOWD sent at 1/6/2025  1:49 PM EST -----  Regarding: ECC Escalation To Practice  ECC Escalation To Practice      Type of Escalation: Acute Care Symptom  --------------------------------------------------------------------------------------------------------------------------    Information for Provider:  Patient is looking for appointment for: Symptom bump on face currently bleeding , started growing 2 months ago   Reasons for Message: Unable to reach practice     Additional Information Patient to be referred by a dermatologist to be treated  --------------------------------------------------------------------------------------------------------------------------    Relationship to Patient: Self     Call Back Info: OK to leave message on voicemail  Preferred Call Back Number: Phone [phone]

## 2025-01-06 NOTE — TELEPHONE ENCOUNTER
Skin spot on chin. Started months ago. Denied walk in clinic. Would like to wait for appt with PCP on 1/14.

## 2025-01-13 SDOH — ECONOMIC STABILITY: FOOD INSECURITY: WITHIN THE PAST 12 MONTHS, YOU WORRIED THAT YOUR FOOD WOULD RUN OUT BEFORE YOU GOT MONEY TO BUY MORE.: NEVER TRUE

## 2025-01-13 SDOH — ECONOMIC STABILITY: INCOME INSECURITY: IN THE LAST 12 MONTHS, WAS THERE A TIME WHEN YOU WERE NOT ABLE TO PAY THE MORTGAGE OR RENT ON TIME?: NO

## 2025-01-13 SDOH — ECONOMIC STABILITY: FOOD INSECURITY: WITHIN THE PAST 12 MONTHS, THE FOOD YOU BOUGHT JUST DIDN'T LAST AND YOU DIDN'T HAVE MONEY TO GET MORE.: NEVER TRUE

## 2025-01-13 ASSESSMENT — PATIENT HEALTH QUESTIONNAIRE - PHQ9
5. POOR APPETITE OR OVEREATING: SEVERAL DAYS
10. IF YOU CHECKED OFF ANY PROBLEMS, HOW DIFFICULT HAVE THESE PROBLEMS MADE IT FOR YOU TO DO YOUR WORK, TAKE CARE OF THINGS AT HOME, OR GET ALONG WITH OTHER PEOPLE: SOMEWHAT DIFFICULT
7. TROUBLE CONCENTRATING ON THINGS, SUCH AS READING THE NEWSPAPER OR WATCHING TELEVISION: NEARLY EVERY DAY
5. POOR APPETITE OR OVEREATING: SEVERAL DAYS
7. TROUBLE CONCENTRATING ON THINGS, SUCH AS READING THE NEWSPAPER OR WATCHING TELEVISION: NEARLY EVERY DAY
SUM OF ALL RESPONSES TO PHQ9 QUESTIONS 1 & 2: 2
SUM OF ALL RESPONSES TO PHQ QUESTIONS 1-9: 12
8. MOVING OR SPEAKING SO SLOWLY THAT OTHER PEOPLE COULD HAVE NOTICED. OR THE OPPOSITE - BEING SO FIDGETY OR RESTLESS THAT YOU HAVE BEEN MOVING AROUND A LOT MORE THAN USUAL: MORE THAN HALF THE DAYS
6. FEELING BAD ABOUT YOURSELF - OR THAT YOU ARE A FAILURE OR HAVE LET YOURSELF OR YOUR FAMILY DOWN: SEVERAL DAYS
3. TROUBLE FALLING OR STAYING ASLEEP: NOT AT ALL
9. THOUGHTS THAT YOU WOULD BE BETTER OFF DEAD, OR OF HURTING YOURSELF: NOT AT ALL
6. FEELING BAD ABOUT YOURSELF - OR THAT YOU ARE A FAILURE OR HAVE LET YOURSELF OR YOUR FAMILY DOWN: SEVERAL DAYS
3. TROUBLE FALLING OR STAYING ASLEEP: NOT AT ALL
SUM OF ALL RESPONSES TO PHQ QUESTIONS 1-9: 12
SUM OF ALL RESPONSES TO PHQ QUESTIONS 1-9: 12
9. THOUGHTS THAT YOU WOULD BE BETTER OFF DEAD, OR OF HURTING YOURSELF: NOT AT ALL
SUM OF ALL RESPONSES TO PHQ QUESTIONS 1-9: 12
10. IF YOU CHECKED OFF ANY PROBLEMS, HOW DIFFICULT HAVE THESE PROBLEMS MADE IT FOR YOU TO DO YOUR WORK, TAKE CARE OF THINGS AT HOME, OR GET ALONG WITH OTHER PEOPLE: SOMEWHAT DIFFICULT
4. FEELING TIRED OR HAVING LITTLE ENERGY: NEARLY EVERY DAY
1. LITTLE INTEREST OR PLEASURE IN DOING THINGS: NOT AT ALL
2. FEELING DOWN, DEPRESSED OR HOPELESS: MORE THAN HALF THE DAYS
SUM OF ALL RESPONSES TO PHQ QUESTIONS 1-9: 12
4. FEELING TIRED OR HAVING LITTLE ENERGY: NEARLY EVERY DAY
8. MOVING OR SPEAKING SO SLOWLY THAT OTHER PEOPLE COULD HAVE NOTICED. OR THE OPPOSITE, BEING SO FIGETY OR RESTLESS THAT YOU HAVE BEEN MOVING AROUND A LOT MORE THAN USUAL: MORE THAN HALF THE DAYS
1. LITTLE INTEREST OR PLEASURE IN DOING THINGS: NOT AT ALL
2. FEELING DOWN, DEPRESSED OR HOPELESS: MORE THAN HALF THE DAYS

## 2025-01-14 ENCOUNTER — OFFICE VISIT (OUTPATIENT)
Dept: INTERNAL MEDICINE CLINIC | Age: 25
End: 2025-01-14
Payer: MEDICAID

## 2025-01-14 VITALS
WEIGHT: 255 LBS | BODY MASS INDEX: 48.15 KG/M2 | HEART RATE: 96 BPM | HEIGHT: 61 IN | OXYGEN SATURATION: 99 % | SYSTOLIC BLOOD PRESSURE: 112 MMHG | DIASTOLIC BLOOD PRESSURE: 76 MMHG

## 2025-01-14 DIAGNOSIS — F31.81 BIPOLAR 2 DISORDER (HCC): ICD-10-CM

## 2025-01-14 DIAGNOSIS — L81.9 BLEEDING PIGMENTED SKIN LESION: Primary | ICD-10-CM

## 2025-01-14 PROCEDURE — 1036F TOBACCO NON-USER: CPT | Performed by: INTERNAL MEDICINE

## 2025-01-14 PROCEDURE — G8427 DOCREV CUR MEDS BY ELIG CLIN: HCPCS | Performed by: INTERNAL MEDICINE

## 2025-01-14 PROCEDURE — 99213 OFFICE O/P EST LOW 20 MIN: CPT | Performed by: INTERNAL MEDICINE

## 2025-01-14 PROCEDURE — G8417 CALC BMI ABV UP PARAM F/U: HCPCS | Performed by: INTERNAL MEDICINE

## 2025-01-14 NOTE — PROGRESS NOTES
OFFICE VISIT  PROGRESS NOTE         Date of patient's visit: 1/14/25  Patient's Name:  Kimberley Donis  YOB: 2000       Patient Care Team:  Luke Morrow MD as PCP - General (Internal Medicine)  Luke Morrow MD as PCP - Empaneled Provider  Win Valdivia MD as Consulting Physician (Gastroenterology)        SUBJECTIVE     HISTORY           History of present illness     Pertinent details  added to ,       Chief Complaint   Patient presents with    Skin Problem     Skin spot on chin, bleeding. Started a few weeks ago.           Encounter Diagnoses   Name Primary?    Bleeding pigmented skin lesion chin Yes    Bipolar 2 disorder (HCC)         Symptom / problem          --history obtained from patient.-   Lesion on chin pigmented   Ist noticed in11/25   Bled new years day    About 3mm size     MEDICATIONS:      Current Outpatient Medications   Medication Sig Dispense Refill    vitamin D 50 MCG (2000 UT) CAPS capsule Take 1 capsule by mouth daily      L-Theanine 100 MG CAPS Take by mouth      liothyronine (CYTOMEL) 5 MCG tablet Take 1 tablet by mouth 3 times daily 90 tablet 1    levothyroxine (SYNTHROID) 150 MCG tablet Take 1 tablet by mouth daily 90 tablet 3    promethazine (PHENERGAN) 25 MG tablet Take 1 tablet by mouth every 8 hours as needed for Nausea 50 tablet 0    escitalopram (LEXAPRO) 20 MG tablet       omalizumab 150 MG injection Inject 150 mg into the skin every 28 days 1 each 3    LORazepam (ATIVAN) 1 MG tablet Take 1 tablet by mouth every 6 hours as needed for Anxiety.      mirtazapine (REMERON) 30 MG tablet Take 1 tablet by mouth nightly      Melatonin 10 MG TABS Take 10 mg by mouth nightly as needed      OXcarbazepine (TRILEPTAL) 600 MG tablet Take 1 tablet by mouth 2 times daily

## 2025-01-14 NOTE — PROGRESS NOTES
\"Have you been to the ER, urgent care clinic since your last visit?  Hospitalized since your last visit?\"    NO    “Have you seen or consulted any other health care providers outside our system since your last visit?”    NO     “Have you had a pap smear?”    NO    Date of last Cervical Cancer screen (HPV or PAP): 4/14/2021               Satisfactory

## 2025-02-05 ENCOUNTER — TELEPHONE (OUTPATIENT)
Dept: INTERNAL MEDICINE CLINIC | Age: 25
End: 2025-02-05

## 2025-02-05 NOTE — TELEPHONE ENCOUNTER
----- Message from Vero MCINTOSH sent at 2/5/2025 10:11 AM EST -----  Regarding: ECC Referral Request  ECC Referral Request    Reason for referral request: Specialty Provider    Specialist/Lab/Test patient is requesting (if known):Dermatologist     Specialist Phone Number (if applicable):N/A    Additional Information: Patient called regarding a dermatologist, Patient said that the office recommended did not take her insurance ([employer] [employer]) and she needs the nearest dermatologist   --------------------------------------------------------------------------------------------------------------------------    Relationship to Patient: Self     Call Back Information: OK to leave message on voicemail  Preferred Call Back Number: Phone

## 2025-02-14 DIAGNOSIS — E06.3 HYPOTHYROIDISM DUE TO HASHIMOTO'S THYROIDITIS: ICD-10-CM

## 2025-02-18 RX ORDER — LIOTHYRONINE SODIUM 5 UG/1
5 TABLET ORAL 3 TIMES DAILY
Qty: 90 TABLET | Refills: 0 | Status: SHIPPED | OUTPATIENT
Start: 2025-02-18

## 2025-03-14 DIAGNOSIS — E06.3 HYPOTHYROIDISM DUE TO HASHIMOTO'S THYROIDITIS: ICD-10-CM

## 2025-03-19 RX ORDER — LIOTHYRONINE SODIUM 5 UG/1
5 TABLET ORAL 3 TIMES DAILY
Qty: 90 TABLET | Refills: 0 | Status: SHIPPED | OUTPATIENT
Start: 2025-03-19

## 2025-04-15 DIAGNOSIS — E06.3 HYPOTHYROIDISM DUE TO HASHIMOTO'S THYROIDITIS: ICD-10-CM

## 2025-04-17 RX ORDER — LIOTHYRONINE SODIUM 5 UG/1
5 TABLET ORAL 3 TIMES DAILY
Qty: 90 TABLET | Refills: 0 | Status: SHIPPED | OUTPATIENT
Start: 2025-04-17

## 2025-06-04 NOTE — TELEPHONE ENCOUNTER
Last visit: 01/14/25  Last Med refill: 08/19/24  Does patient have enough medication for 72 hours: No: Patient will be scheduling a NTP appt with her mychart. Ticket sent to her today.     Next Visit Date:  No future appointments.    Health Maintenance   Topic Date Due    HPV vaccine (1 - 3-dose series) Never done    HIV screen  Never done    DTaP/Tdap/Td vaccine (7 - Td or Tdap) 08/16/2021    Pap smear  04/14/2024    COVID-19 Vaccine (1 - 2024-25 season) Never done    Flu vaccine (Season Ended) 08/01/2025    Depression Monitoring  01/13/2026    Hepatitis B vaccine  Completed    Hib vaccine  Completed    Polio vaccine  Completed    Varicella vaccine  Completed    Hepatitis C screen  Completed    Hepatitis A vaccine  Aged Out    Meningococcal (ACWY) vaccine  Aged Out    Meningococcal B vaccine  Aged Out    Pneumococcal 0-49 years Vaccine  Aged Out    Depression Screen  Discontinued       No results found for: \"LABA1C\"          ( goal A1C is < 7)   No components found for: \"LABMICR\"  No components found for: \"LDLCHOLESTEROL\", \"LDLCALC\"    (goal LDL is <100)   AST (U/L)   Date Value   11/27/2018 8     ALT (U/L)   Date Value   11/27/2018 6     BUN (mg/dL)   Date Value   11/03/2023 9     BP Readings from Last 3 Encounters:   01/14/25 112/76   11/04/24 116/66   11/03/23 97/76          (goal 120/80)    All Future Testing planned in [facility]  Lab Frequency Next Occurrence   CBC Once 11/04/2024   Comprehensive Metabolic Panel Once 11/04/2024               Patient Active Problem List:     Chronic idiopathic urticaria     Hemangioma of liver     Anxiety     Vasodepressor syncope     Hypothyroidism due to Hashimoto's thyroiditis     Bipolar 2 disorder (HCC)     Class 3 severe obesity due to excess calories with body mass index (BMI) of 45.0 to 49.9 in adult (HCC)     Bleeding pigmented skin lesion chin

## 2025-06-05 RX ORDER — PROMETHAZINE HYDROCHLORIDE 25 MG/1
25 TABLET ORAL EVERY 8 HOURS PRN
Qty: 50 TABLET | Refills: 0 | Status: SHIPPED | OUTPATIENT
Start: 2025-06-05

## 2025-08-20 ENCOUNTER — TELEPHONE (OUTPATIENT)
Dept: INTERNAL MEDICINE CLINIC | Age: 25
End: 2025-08-20

## (undated) DEVICE — GOWN,POLY REINFORCED,LG: Brand: MEDLINE

## (undated) DEVICE — JELLY,LUBE,STERILE,FLIP TOP,TUBE,2-OZ: Brand: MEDLINE

## (undated) DEVICE — Z DUPLICATE USE 2527422 TUBING O2 STD 7 FT EXTN NO CRUSH VISUAL CNTRST LF

## (undated) DEVICE — FORCEPS BX L240CM JAW DIA2.8MM L CAP W/ NDL MIC MESH TOOTH

## (undated) DEVICE — Z DISCONTINUED USE 2424143 ADAPTER O2 SWVL CHRISTMAS TREE GRN

## (undated) DEVICE — CANNULA NSL AD TBNG L7FT PVC STR NONFLARED PRNG O2 DEL W STD

## (undated) DEVICE — GLOVE ORANGE PI 7   MSG9070

## (undated) DEVICE — Z INACTIVE USE 2525529 CONNECTOR TBNG FOR O2

## (undated) DEVICE — DEFENDO AIR WATER SUCTION AND BIOPSY VALVE KIT FOR  OLYMPUS: Brand: DEFENDO AIR/WATER/SUCTION AND BIOPSY VALVE

## (undated) DEVICE — BITEBLOCK 54FR W/ DENT RIM BLOX